# Patient Record
Sex: MALE | Race: WHITE | NOT HISPANIC OR LATINO | Employment: OTHER | URBAN - METROPOLITAN AREA
[De-identification: names, ages, dates, MRNs, and addresses within clinical notes are randomized per-mention and may not be internally consistent; named-entity substitution may affect disease eponyms.]

---

## 2019-09-24 ENCOUNTER — APPOINTMENT (OUTPATIENT)
Dept: GENERAL RADIOLOGY | Facility: HOSPITAL | Age: 84
End: 2019-09-24

## 2019-09-24 ENCOUNTER — HOSPITAL ENCOUNTER (EMERGENCY)
Facility: HOSPITAL | Age: 84
Discharge: HOME | End: 2019-09-24
Admitting: EMERGENCY MEDICINE

## 2019-09-24 VITALS
OXYGEN SATURATION: 99 % | TEMPERATURE: 97.5 F | BODY MASS INDEX: 19.72 KG/M2 | SYSTOLIC BLOOD PRESSURE: 144 MMHG | HEIGHT: 67 IN | DIASTOLIC BLOOD PRESSURE: 69 MMHG | HEART RATE: 67 BPM | WEIGHT: 125.66 LBS | RESPIRATION RATE: 16 BRPM

## 2019-09-24 DIAGNOSIS — S30.0XXA CONTUSION OF LOWER BACK, INITIAL ENCOUNTER: Primary | ICD-10-CM

## 2019-09-24 PROCEDURE — 99282 EMERGENCY DEPT VISIT SF MDM: CPT

## 2019-09-24 PROCEDURE — 72170 X-RAY EXAM OF PELVIS: CPT

## 2019-09-25 NOTE — ED PROVIDER NOTES
Subjective   Patient states that earlier this evening he slipped out of chair landing on his buttocks.  States initially he had pain going across his lower back but that has resolved.  He denies any bowel or bladder difficulties there is no saddle anesthesia.  Patient is ambulatory with assistance            Review of Systems   Constitutional: Negative for fever.   Gastrointestinal: Negative for abdominal pain.   Genitourinary: Negative for difficulty urinating.   Musculoskeletal: Positive for back pain. Negative for gait problem.       History reviewed. No pertinent past medical history.    Allergies   Allergen Reactions   • Sulfa Antibiotics Hives       History reviewed. No pertinent surgical history.    History reviewed. No pertinent family history.    Social History     Socioeconomic History   • Marital status:      Spouse name: Not on file   • Number of children: Not on file   • Years of education: Not on file   • Highest education level: Not on file   Tobacco Use   • Smoking status: Never Smoker   • Smokeless tobacco: Never Used   Substance and Sexual Activity   • Alcohol use: Yes     Alcohol/week: 4.2 oz     Types: 7 Glasses of wine per week   • Drug use: No           Objective   Physical Exam  89-year-old gentleman awake alert and in no acute distress there is no obvious injuries to face or head the neck is supple nontender with good range of motion of the back visually there is no obvious abnormality no bruising swelling deformity there is no point tenderness along the spine he has minimal tenderness across the lower back with no palpable abnormality and pelvis are stable on palpation tendon reflexes are equal in the legs  Procedures           ED Course    Xr Pelvis 1 Or 2 View    Result Date: 9/24/2019  No acute fracture is identified.  Electronically Signed By-Dr. Tyler Winston MD On:9/24/2019 9:12 PM This report was finalized on [id number] by Dr. Tyler Winston MD.      Patient has negative  x-ray will use supportive care for his low back contusion            MDM    Final diagnoses:   Contusion of lower back, initial encounter              Rashawn Dunaway, SOFIA  09/24/19 7168

## 2019-09-25 NOTE — ED NOTES
Pt states was sitting on edge of couch and going to stand.  When he reached for the bed post when he went to stand he didn't grab it and had to sit himself down on the floor.  Granddaughter states he was there about an hour.  C/o lower back pain at first but no c/o at present per [facility].      Deepali Sebastian RN  09/24/19 2110

## 2020-03-05 ENCOUNTER — OFFICE VISIT (OUTPATIENT)
Dept: WOUND CARE | Facility: HOSPITAL | Age: 85
End: 2020-03-05

## 2020-03-05 PROCEDURE — G0463 HOSPITAL OUTPT CLINIC VISIT: HCPCS

## 2020-03-06 ENCOUNTER — TRANSCRIBE ORDERS (OUTPATIENT)
Dept: LAB | Facility: HOSPITAL | Age: 85
End: 2020-03-06

## 2020-03-06 ENCOUNTER — LAB (OUTPATIENT)
Dept: LAB | Facility: HOSPITAL | Age: 85
End: 2020-03-06

## 2020-03-06 DIAGNOSIS — L89.152 STAGE II PRESSURE ULCER OF SACRAL REGION (HCC): ICD-10-CM

## 2020-03-06 DIAGNOSIS — L89.152 STAGE II PRESSURE ULCER OF SACRAL REGION (HCC): Primary | ICD-10-CM

## 2020-03-06 LAB
ALBUMIN SERPL-MCNC: 4.5 G/DL (ref 3.5–5.2)
ALBUMIN/GLOB SERPL: 1.5 G/DL
ALP SERPL-CCNC: 64 U/L (ref 39–117)
ALT SERPL W P-5'-P-CCNC: 15 U/L (ref 1–41)
ANION GAP SERPL CALCULATED.3IONS-SCNC: 10.1 MMOL/L (ref 5–15)
AST SERPL-CCNC: 29 U/L (ref 1–40)
BASOPHILS # BLD AUTO: 0.03 10*3/MM3 (ref 0–0.2)
BASOPHILS NFR BLD AUTO: 0.5 % (ref 0–1.5)
BILIRUB SERPL-MCNC: 0.4 MG/DL (ref 0.2–1.2)
BUN BLD-MCNC: 16 MG/DL (ref 8–23)
BUN/CREAT SERPL: 15.1 (ref 7–25)
CALCIUM SPEC-SCNC: 9.8 MG/DL (ref 8.2–9.6)
CHLORIDE SERPL-SCNC: 99 MMOL/L (ref 98–107)
CO2 SERPL-SCNC: 29.9 MMOL/L (ref 22–29)
CREAT BLD-MCNC: 1.06 MG/DL (ref 0.76–1.27)
DEPRECATED RDW RBC AUTO: 41.2 FL (ref 37–54)
EOSINOPHIL # BLD AUTO: 0.08 10*3/MM3 (ref 0–0.4)
EOSINOPHIL NFR BLD AUTO: 1.4 % (ref 0.3–6.2)
ERYTHROCYTE [DISTWIDTH] IN BLOOD BY AUTOMATED COUNT: 12.1 % (ref 12.3–15.4)
GFR SERPL CREATININE-BSD FRML MDRD: 65 ML/MIN/1.73
GLOBULIN UR ELPH-MCNC: 3 GM/DL
GLUCOSE BLD-MCNC: 98 MG/DL (ref 65–99)
HCT VFR BLD AUTO: 43.7 % (ref 37.5–51)
HGB BLD-MCNC: 15.2 G/DL (ref 13–17.7)
IMM GRANULOCYTES # BLD AUTO: 0.02 10*3/MM3 (ref 0–0.05)
IMM GRANULOCYTES NFR BLD AUTO: 0.3 % (ref 0–0.5)
LYMPHOCYTES # BLD AUTO: 1.67 10*3/MM3 (ref 0.7–3.1)
LYMPHOCYTES NFR BLD AUTO: 29 % (ref 19.6–45.3)
MCH RBC QN AUTO: 32.1 PG (ref 26.6–33)
MCHC RBC AUTO-ENTMCNC: 34.8 G/DL (ref 31.5–35.7)
MCV RBC AUTO: 92.2 FL (ref 79–97)
MONOCYTES # BLD AUTO: 0.71 10*3/MM3 (ref 0.1–0.9)
MONOCYTES NFR BLD AUTO: 12.3 % (ref 5–12)
NEUTROPHILS # BLD AUTO: 3.24 10*3/MM3 (ref 1.7–7)
NEUTROPHILS NFR BLD AUTO: 56.5 % (ref 42.7–76)
NRBC BLD AUTO-RTO: 0 /100 WBC (ref 0–0.2)
PLATELET # BLD AUTO: 224 10*3/MM3 (ref 140–450)
PMV BLD AUTO: 11 FL (ref 6–12)
POTASSIUM BLD-SCNC: 4.1 MMOL/L (ref 3.5–5.2)
PREALB SERPL-MCNC: 25.9 MG/DL (ref 20–40)
PROT SERPL-MCNC: 7.5 G/DL (ref 6–8.5)
RBC # BLD AUTO: 4.74 10*6/MM3 (ref 4.14–5.8)
SODIUM BLD-SCNC: 139 MMOL/L (ref 136–145)
WBC NRBC COR # BLD: 5.75 10*3/MM3 (ref 3.4–10.8)

## 2020-03-06 PROCEDURE — 36415 COLL VENOUS BLD VENIPUNCTURE: CPT

## 2020-03-06 PROCEDURE — 80053 COMPREHEN METABOLIC PANEL: CPT

## 2020-03-06 PROCEDURE — 84134 ASSAY OF PREALBUMIN: CPT

## 2020-03-06 PROCEDURE — 85025 COMPLETE CBC W/AUTO DIFF WBC: CPT

## 2020-03-12 ENCOUNTER — APPOINTMENT (OUTPATIENT)
Dept: WOUND CARE | Facility: HOSPITAL | Age: 85
End: 2020-03-12

## 2020-03-19 ENCOUNTER — APPOINTMENT (OUTPATIENT)
Dept: WOUND CARE | Facility: HOSPITAL | Age: 85
End: 2020-03-19

## 2021-01-31 ENCOUNTER — HOSPITAL ENCOUNTER (EMERGENCY)
Facility: HOSPITAL | Age: 86
Discharge: HOME | End: 2021-01-31
Attending: EMERGENCY MEDICINE | Admitting: EMERGENCY MEDICINE

## 2021-01-31 ENCOUNTER — APPOINTMENT (OUTPATIENT)
Dept: GENERAL RADIOLOGY | Facility: HOSPITAL | Age: 86
End: 2021-01-31

## 2021-01-31 ENCOUNTER — APPOINTMENT (OUTPATIENT)
Dept: CT IMAGING | Facility: HOSPITAL | Age: 86
End: 2021-01-31

## 2021-01-31 VITALS
WEIGHT: 125 LBS | HEIGHT: 70 IN | TEMPERATURE: 98.1 F | SYSTOLIC BLOOD PRESSURE: 159 MMHG | OXYGEN SATURATION: 96 % | DIASTOLIC BLOOD PRESSURE: 69 MMHG | RESPIRATION RATE: 16 BRPM | HEART RATE: 67 BPM | BODY MASS INDEX: 17.9 KG/M2
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
## 2021-01-31 DIAGNOSIS — S20.229A CONTUSION OF BACK, UNSPECIFIED LATERALITY, INITIAL ENCOUNTER: ICD-10-CM

## 2021-01-31 DIAGNOSIS — S00.03XA CONTUSION OF SCALP, INITIAL ENCOUNTER: ICD-10-CM

## 2021-01-31 LAB
ALBUMIN SERPL-MCNC: 4.3 G/DL (ref 3.5–5.2)
ALBUMIN/GLOB SERPL: 1.2 G/DL
ALP SERPL-CCNC: 87 U/L (ref 39–117)
ALT SERPL W P-5'-P-CCNC: 17 U/L (ref 1–41)
ANION GAP SERPL CALCULATED.3IONS-SCNC: 12 MMOL/L (ref 5–15)
AST SERPL-CCNC: 38 U/L (ref 1–40)
BACTERIA UR QL AUTO: ABNORMAL /HPF
BASOPHILS # BLD AUTO: 0 10*3/MM3 (ref 0–0.2)
BASOPHILS NFR BLD AUTO: 0.2 % (ref 0–1.5)
BILIRUB SERPL-MCNC: 0.4 MG/DL (ref 0–1.2)
BILIRUB UR QL STRIP: NEGATIVE
BUN SERPL-MCNC: 22 MG/DL (ref 8–23)
BUN/CREAT SERPL: 18.3 (ref 7–25)
CALCIUM SPEC-SCNC: 9.9 MG/DL (ref 8.2–9.6)
CHLORIDE SERPL-SCNC: 102 MMOL/L (ref 98–107)
CK SERPL-CCNC: 824 U/L (ref 20–200)
CLARITY UR: CLEAR
CO2 SERPL-SCNC: 25 MMOL/L (ref 22–29)
COHGB MFR BLD: 1.8 % (ref 0–3)
COLOR UR: YELLOW
CREAT SERPL-MCNC: 1.2 MG/DL (ref 0.76–1.27)
DEPRECATED RDW RBC AUTO: 42.4 FL (ref 37–54)
EOSINOPHIL # BLD AUTO: 0 10*3/MM3 (ref 0–0.4)
EOSINOPHIL NFR BLD AUTO: 0 % (ref 0.3–6.2)
ERYTHROCYTE [DISTWIDTH] IN BLOOD BY AUTOMATED COUNT: 13.2 % (ref 12.3–15.4)
GFR SERPL CREATININE-BSD FRML MDRD: 56 ML/MIN/1.73
GLOBULIN UR ELPH-MCNC: 3.5 GM/DL
GLUCOSE SERPL-MCNC: 147 MG/DL (ref 65–99)
GLUCOSE UR STRIP-MCNC: NEGATIVE MG/DL
HCT VFR BLD AUTO: 42.1 % (ref 37.5–51)
HGB BLD-MCNC: 14.2 G/DL (ref 13–17.7)
HGB UR QL STRIP.AUTO: ABNORMAL
HYALINE CASTS UR QL AUTO: ABNORMAL /LPF
KETONES UR QL STRIP: NEGATIVE
LEUKOCYTE ESTERASE UR QL STRIP.AUTO: NEGATIVE
LYMPHOCYTES # BLD AUTO: 0.4 10*3/MM3 (ref 0.7–3.1)
LYMPHOCYTES NFR BLD AUTO: 3.4 % (ref 19.6–45.3)
MAGNESIUM SERPL-MCNC: 1.8 MG/DL (ref 1.7–2.3)
MCH RBC QN AUTO: 31.7 PG (ref 26.6–33)
MCHC RBC AUTO-ENTMCNC: 33.8 G/DL (ref 31.5–35.7)
MCV RBC AUTO: 93.7 FL (ref 79–97)
MONOCYTES # BLD AUTO: 1.1 10*3/MM3 (ref 0.1–0.9)
MONOCYTES NFR BLD AUTO: 8.7 % (ref 5–12)
NEUTROPHILS NFR BLD AUTO: 10.7 10*3/MM3 (ref 1.7–7)
NEUTROPHILS NFR BLD AUTO: 87.7 % (ref 42.7–76)
NITRITE UR QL STRIP: NEGATIVE
NRBC BLD AUTO-RTO: 0.1 /100 WBC (ref 0–0.2)
PH UR STRIP.AUTO: <=5 [PH] (ref 5–8)
PLATELET # BLD AUTO: 228 10*3/MM3 (ref 140–450)
PMV BLD AUTO: 8.3 FL (ref 6–12)
POTASSIUM SERPL-SCNC: 4.1 MMOL/L (ref 3.5–5.2)
PROT SERPL-MCNC: 7.8 G/DL (ref 6–8.5)
PROT UR QL STRIP: ABNORMAL
RBC # BLD AUTO: 4.49 10*6/MM3 (ref 4.14–5.8)
RBC # UR: ABNORMAL /HPF
REF LAB TEST METHOD: ABNORMAL
SODIUM SERPL-SCNC: 139 MMOL/L (ref 136–145)
SP GR UR STRIP: 1.02 (ref 1–1.03)
SQUAMOUS #/AREA URNS HPF: ABNORMAL /HPF
TROPONIN T SERPL-MCNC: <0.01 NG/ML (ref 0–0.03)
UROBILINOGEN UR QL STRIP: ABNORMAL
WBC # BLD AUTO: 12.2 10*3/MM3 (ref 3.4–10.8)
WBC UR QL AUTO: ABNORMAL /HPF
WHOLE BLOOD HOLD SPECIMEN: NORMAL

## 2021-01-31 PROCEDURE — P9612 CATHETERIZE FOR URINE SPEC: HCPCS

## 2021-01-31 PROCEDURE — 83735 ASSAY OF MAGNESIUM: CPT | Performed by: EMERGENCY MEDICINE

## 2021-01-31 PROCEDURE — 81001 URINALYSIS AUTO W/SCOPE: CPT | Performed by: EMERGENCY MEDICINE

## 2021-01-31 PROCEDURE — 70450 CT HEAD/BRAIN W/O DYE: CPT

## 2021-01-31 PROCEDURE — 84484 ASSAY OF TROPONIN QUANT: CPT | Performed by: EMERGENCY MEDICINE

## 2021-01-31 PROCEDURE — 72128 CT CHEST SPINE W/O DYE: CPT

## 2021-01-31 PROCEDURE — 80053 COMPREHEN METABOLIC PANEL: CPT | Performed by: EMERGENCY MEDICINE

## 2021-01-31 PROCEDURE — 99284 EMERGENCY DEPT VISIT MOD MDM: CPT

## 2021-01-31 PROCEDURE — 85025 COMPLETE CBC W/AUTO DIFF WBC: CPT | Performed by: EMERGENCY MEDICINE

## 2021-01-31 PROCEDURE — 72125 CT NECK SPINE W/O DYE: CPT

## 2021-01-31 PROCEDURE — 93005 ELECTROCARDIOGRAM TRACING: CPT | Performed by: EMERGENCY MEDICINE

## 2021-01-31 PROCEDURE — 71045 X-RAY EXAM CHEST 1 VIEW: CPT

## 2021-01-31 PROCEDURE — 82375 ASSAY CARBOXYHB QUANT: CPT | Performed by: EMERGENCY MEDICINE

## 2021-01-31 PROCEDURE — 82550 ASSAY OF CK (CPK): CPT | Performed by: EMERGENCY MEDICINE

## 2021-01-31 RX ORDER — SODIUM CHLORIDE 0.9 % (FLUSH) 0.9 %
10 SYRINGE (ML) INJECTION AS NEEDED
Status: DISCONTINUED | OUTPATIENT
Start: 2021-01-31 | End: 2021-01-31 | Stop reason: HOSPADM

## 2021-01-31 RX ORDER — ACETAMINOPHEN 500 MG
1000 TABLET ORAL ONCE
Status: DISCONTINUED | OUTPATIENT
Start: 2021-01-31 | End: 2021-01-31 | Stop reason: HOSPADM

## 2021-01-31 RX ADMIN — SODIUM CHLORIDE 500 ML: 9 INJECTION, SOLUTION INTRAVENOUS at 13:10

## 2021-02-02 LAB — QT INTERVAL: 435 MS

## 2021-11-11 ENCOUNTER — HOSPITAL ENCOUNTER (OUTPATIENT)
Facility: HOSPITAL | Age: 86
Setting detail: OBSERVATION
Discharge: HOME | End: 2021-11-13
Attending: EMERGENCY MEDICINE | Admitting: EMERGENCY MEDICINE

## 2021-11-11 DIAGNOSIS — S03.2XXA TOOTH AVULSION, INITIAL ENCOUNTER: ICD-10-CM
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
## 2021-11-11 DIAGNOSIS — R53.1 WEAKNESS: Primary | ICD-10-CM

## 2021-11-11 LAB
ANION GAP SERPL CALCULATED.3IONS-SCNC: 14 MMOL/L (ref 5–15)
BASOPHILS # BLD AUTO: 0 10*3/MM3 (ref 0–0.2)
BASOPHILS NFR BLD AUTO: 0.2 % (ref 0–1.5)
BUN SERPL-MCNC: 23 MG/DL (ref 8–23)
BUN/CREAT SERPL: 23 (ref 7–25)
CALCIUM SPEC-SCNC: 9.5 MG/DL (ref 8.2–9.6)
CHLORIDE SERPL-SCNC: 102 MMOL/L (ref 98–107)
CK SERPL-CCNC: 318 U/L (ref 20–200)
CO2 SERPL-SCNC: 23 MMOL/L (ref 22–29)
CREAT SERPL-MCNC: 1 MG/DL (ref 0.76–1.27)
DEPRECATED RDW RBC AUTO: 43.8 FL (ref 37–54)
EOSINOPHIL # BLD AUTO: 0 10*3/MM3 (ref 0–0.4)
EOSINOPHIL NFR BLD AUTO: 0 % (ref 0.3–6.2)
ERYTHROCYTE [DISTWIDTH] IN BLOOD BY AUTOMATED COUNT: 13.4 % (ref 12.3–15.4)
GFR SERPL CREATININE-BSD FRML MDRD: 69 ML/MIN/1.73
GLUCOSE SERPL-MCNC: 117 MG/DL (ref 65–99)
HCT VFR BLD AUTO: 38.4 % (ref 37.5–51)
HGB BLD-MCNC: 13.5 G/DL (ref 13–17.7)
LYMPHOCYTES # BLD AUTO: 0.5 10*3/MM3 (ref 0.7–3.1)
LYMPHOCYTES NFR BLD AUTO: 3.3 % (ref 19.6–45.3)
MCH RBC QN AUTO: 32.6 PG (ref 26.6–33)
MCHC RBC AUTO-ENTMCNC: 35.2 G/DL (ref 31.5–35.7)
MCV RBC AUTO: 92.7 FL (ref 79–97)
MONOCYTES # BLD AUTO: 1.1 10*3/MM3 (ref 0.1–0.9)
MONOCYTES NFR BLD AUTO: 7.6 % (ref 5–12)
NEUTROPHILS NFR BLD AUTO: 12.4 10*3/MM3 (ref 1.7–7)
NEUTROPHILS NFR BLD AUTO: 88.9 % (ref 42.7–76)
NRBC BLD AUTO-RTO: 0 /100 WBC (ref 0–0.2)
PLATELET # BLD AUTO: 196 10*3/MM3 (ref 140–450)
PMV BLD AUTO: 8.3 FL (ref 6–12)
POTASSIUM SERPL-SCNC: 3.9 MMOL/L (ref 3.5–5.2)
RBC # BLD AUTO: 4.14 10*6/MM3 (ref 4.14–5.8)
SARS-COV-2 RNA PNL SPEC NAA+PROBE: NOT DETECTED
SODIUM SERPL-SCNC: 139 MMOL/L (ref 136–145)
TROPONIN T SERPL-MCNC: 0.03 NG/ML (ref 0–0.03)
TROPONIN T SERPL-MCNC: 0.05 NG/ML (ref 0–0.03)
WBC # BLD AUTO: 14 10*3/MM3 (ref 3.4–10.8)

## 2021-11-11 PROCEDURE — 85025 COMPLETE CBC W/AUTO DIFF WBC: CPT | Performed by: EMERGENCY MEDICINE

## 2021-11-11 PROCEDURE — 87635 SARS-COV-2 COVID-19 AMP PRB: CPT | Performed by: EMERGENCY MEDICINE

## 2021-11-11 PROCEDURE — G0378 HOSPITAL OBSERVATION PER HR: HCPCS

## 2021-11-11 PROCEDURE — 93005 ELECTROCARDIOGRAM TRACING: CPT | Performed by: EMERGENCY MEDICINE

## 2021-11-11 PROCEDURE — 84484 ASSAY OF TROPONIN QUANT: CPT | Performed by: EMERGENCY MEDICINE

## 2021-11-11 PROCEDURE — 80048 BASIC METABOLIC PNL TOTAL CA: CPT | Performed by: EMERGENCY MEDICINE

## 2021-11-11 PROCEDURE — 82550 ASSAY OF CK (CPK): CPT | Performed by: EMERGENCY MEDICINE

## 2021-11-11 PROCEDURE — C9803 HOPD COVID-19 SPEC COLLECT: HCPCS

## 2021-11-11 PROCEDURE — 99284 EMERGENCY DEPT VISIT MOD MDM: CPT

## 2021-11-11 PROCEDURE — 36415 COLL VENOUS BLD VENIPUNCTURE: CPT | Performed by: EMERGENCY MEDICINE

## 2021-11-11 RX ORDER — ONDANSETRON 2 MG/ML
4 INJECTION INTRAMUSCULAR; INTRAVENOUS EVERY 6 HOURS PRN
Status: DISCONTINUED | OUTPATIENT
Start: 2021-11-11 | End: 2021-11-13 | Stop reason: HOSPADM

## 2021-11-11 RX ORDER — ASPIRIN 81 MG/1
324 TABLET ORAL ONCE
Status: DISCONTINUED | OUTPATIENT
Start: 2021-11-11 | End: 2021-11-11

## 2021-11-11 RX ORDER — ACETAMINOPHEN 325 MG/1
650 TABLET ORAL EVERY 4 HOURS PRN
Status: DISCONTINUED | OUTPATIENT
Start: 2021-11-11 | End: 2021-11-13 | Stop reason: HOSPADM

## 2021-11-11 RX ORDER — SODIUM CHLORIDE 0.9 % (FLUSH) 0.9 %
10 SYRINGE (ML) INJECTION AS NEEDED
Status: DISCONTINUED | OUTPATIENT
Start: 2021-11-11 | End: 2021-11-13 | Stop reason: HOSPADM

## 2021-11-11 RX ORDER — ASPIRIN 325 MG
325 TABLET ORAL ONCE
Status: DISCONTINUED | OUTPATIENT
Start: 2021-11-12 | End: 2021-11-11

## 2021-11-11 RX ORDER — IBUPROFEN 200 MG
200 TABLET ORAL EVERY 6 HOURS PRN
COMMUNITY

## 2021-11-11 RX ORDER — DOCUSATE SODIUM 100 MG/1
100 CAPSULE, LIQUID FILLED ORAL DAILY
COMMUNITY

## 2021-11-11 RX ORDER — ASPIRIN 325 MG
325 TABLET, DELAYED RELEASE (ENTERIC COATED) ORAL ONCE
Status: COMPLETED | OUTPATIENT
Start: 2021-11-12 | End: 2021-11-11

## 2021-11-11 RX ORDER — LATANOPROST 50 UG/ML
1 SOLUTION/ DROPS OPHTHALMIC NIGHTLY
COMMUNITY

## 2021-11-11 RX ORDER — CHOLECALCIFEROL (VITAMIN D3) 125 MCG
5 CAPSULE ORAL NIGHTLY PRN
Status: DISCONTINUED | OUTPATIENT
Start: 2021-11-11 | End: 2021-11-13 | Stop reason: HOSPADM

## 2021-11-11 RX ORDER — SODIUM CHLORIDE 0.9 % (FLUSH) 0.9 %
10 SYRINGE (ML) INJECTION EVERY 12 HOURS SCHEDULED
Status: DISCONTINUED | OUTPATIENT
Start: 2021-11-11 | End: 2021-11-13 | Stop reason: HOSPADM

## 2021-11-11 RX ORDER — DORZOLAMIDE HCL 20 MG/ML
1 SOLUTION/ DROPS OPHTHALMIC 2 TIMES DAILY
COMMUNITY

## 2021-11-11 RX ADMIN — SODIUM CHLORIDE, PRESERVATIVE FREE 10 ML: 5 INJECTION INTRAVENOUS at 20:52

## 2021-11-11 RX ADMIN — ASPIRIN 325 MG: 325 TABLET, DELAYED RELEASE ORAL at 23:27

## 2021-11-11 NOTE — ED PROVIDER NOTES
Subjective   History of Present Illness  Context: 89-year-old male presents after fell at home today.  He was actually caught on camera.  He was using his walker when he just collapsed and fell forward.  He does not think he had syncope.  He really does not complain of any pain at this time.  He was on the ground for about 3 hours before he was found.  He reports no shortness of breath abdominal pain vomiting diarrhea.  He is hard of hearing.  Location: Generalized  Quality: Weakness  Duration: This afternoon  Timing: Constant  Severity:   Modifying factors: Was using walker at time he fell  Associated signs and symptoms:    Review of Systems   Constitutional: Negative for fever.   HENT: Positive for dental problem. Negative for congestion and sore throat.             Eyes: Negative for pain.        Macular degeneration   Respiratory: Negative for cough and shortness of breath.    Cardiovascular: Negative for chest pain and leg swelling.   Gastrointestinal: Positive for constipation. Negative for abdominal pain, diarrhea and vomiting.   Genitourinary: Negative for dysuria and urgency.   Musculoskeletal: Negative for back pain.   Skin: Negative for rash.   Neurological: Positive for weakness. Negative for dizziness and headaches.   Psychiatric/Behavioral: Negative for confusion.       No past medical history on file.  Macular degeneration  Allergies   Allergen Reactions   • Sulfa Antibiotics Hives       No past surgical history on file.    No family history on file.  Social history lives at home with family  Social History     Socioeconomic History   • Marital status:    Tobacco Use   • Smoking status: Never Smoker   • Smokeless tobacco: Never Used   Substance and Sexual Activity   • Alcohol use: Yes     Alcohol/week: 7.0 standard drinks     Types: 7 Glasses of wine per week   • Drug use: No     Only medications are eyedrops and occasional stool softener      Objective   Physical Exam  89-year-old male awake  alert.  He is hard of hearing.  Pupils equal round at light.  He has avulsion of #9 tooth.  There is no bleeding.  Family reports he was scheduled to have teeth extracted.  He appears to have a contusion anterior chin.  He does not complain of any mandibular tenderness he has no difficulty with opening and closing.  He has no complaints of cervical thoracic or lumbar pain.  Chest clear nontender cardiovascular regular rhythm.  Abdomen soft nontender.  Examination extremities he has intact movement of arms legs without deficit.  Speech clear.  Neurologic exam without focal findings noted.  Procedures           ED Course      Results for orders placed or performed during the hospital encounter of 11/11/21   Basic Metabolic Panel    Specimen: Blood   Result Value Ref Range    Glucose 117 (H) 65 - 99 mg/dL    BUN 23 8 - 23 mg/dL    Creatinine 1.00 0.76 - 1.27 mg/dL    Sodium 139 136 - 145 mmol/L    Potassium 3.9 3.5 - 5.2 mmol/L    Chloride 102 98 - 107 mmol/L    CO2 23.0 22.0 - 29.0 mmol/L    Calcium 9.5 8.2 - 9.6 mg/dL    eGFR Non African Amer 69 >60 mL/min/1.73    BUN/Creatinine Ratio 23.0 7.0 - 25.0    Anion Gap 14.0 5.0 - 15.0 mmol/L   Troponin    Specimen: Blood   Result Value Ref Range    Troponin T 0.032 (C) 0.000 - 0.030 ng/mL   CK    Specimen: Blood   Result Value Ref Range    Creatine Kinase 318 (H) 20 - 200 U/L   CBC Auto Differential    Specimen: Blood   Result Value Ref Range    WBC 14.00 (H) 3.40 - 10.80 10*3/mm3    RBC 4.14 4.14 - 5.80 10*6/mm3    Hemoglobin 13.5 13.0 - 17.7 g/dL    Hematocrit 38.4 37.5 - 51.0 %    MCV 92.7 79.0 - 97.0 fL    MCH 32.6 26.6 - 33.0 pg    MCHC 35.2 31.5 - 35.7 g/dL    RDW 13.4 12.3 - 15.4 %    RDW-SD 43.8 37.0 - 54.0 fl    MPV 8.3 6.0 - 12.0 fL    Platelets 196 140 - 450 10*3/mm3    Neutrophil % 88.9 (H) 42.7 - 76.0 %    Lymphocyte % 3.3 (L) 19.6 - 45.3 %    Monocyte % 7.6 5.0 - 12.0 %    Eosinophil % 0.0 (L) 0.3 - 6.2 %    Basophil % 0.2 0.0 - 1.5 %    Neutrophils, Absolute  "12.40 (H) 1.70 - 7.00 10*3/mm3    Lymphocytes, Absolute 0.50 (L) 0.70 - 3.10 10*3/mm3    Monocytes, Absolute 1.10 (H) 0.10 - 0.90 10*3/mm3    Eosinophils, Absolute 0.00 0.00 - 0.40 10*3/mm3    Basophils, Absolute 0.00 0.00 - 0.20 10*3/mm3    nRBC 0.0 0.0 - 0.2 /100 WBC   ECG 12 Lead   Result Value Ref Range    QT Interval 415 ms     No radiology results for the last day  Medications   sodium chloride 0.9 % flush 10 mL (has no administration in time range)     /68 (BP Location: Right arm, Patient Position: Lying)   Pulse 88   Temp 98.1 °F (36.7 °C) (Oral)   Resp 16   Ht 170.2 cm (67\")   Wt 65.8 kg (145 lb)   SpO2 100%   BMI 22.71 kg/m²                                        MDM  Chart review: Patient had evaluation for fall January of this year.  Otherwise he is followed by ophthalmology for macular degeneration and open-angle coma.  Comorbidity: As per past history  Differential: Syncope, rhabdomyolysis, arrhythmia, weakness  My EKG interpretation: Sinus rhythm with anterior ST elevation nonspecific.  There is no reciprocal change.  Compared to previous EKG similar findings were noted.  Lab: Essentially normal basic metabolic panel glucose 117 CK mildly elevated at 318 troponin borderline elevated 0.032 CBC white count 14 with hemoglobin 13.5 platelet count 196 88 segs no bands  Radiology:   Discussion/treatment: Patient had IV placed.  He will be brought in for observation.  May need PT to evaluate for ambulation.  He will be placed on telemetry repeat troponin and CK  Patient was evaluated using appropriate PPE      Final diagnoses:   Weakness   Fall, initial encounter   Tooth avulsion, initial encounter       ED Disposition  ED Disposition     ED Disposition Condition Comment    Decision to Admit            No follow-up provider specified.       Medication List      No changes were made to your prescriptions during this visit.          Brandin Rajput MD  11/11/21 9869    "

## 2021-11-11 NOTE — ED NOTES
Pt fell earlier around 1230 at home while walking. Pt lives with granddaughter. Granddaughter states she was gone, but has a camera in his room. Video appears pt got weak and fell forward, then army crawled over to doorway, where she found him. Pt reported lower back pain to granddaughter after fall. Pt is not complaining of any pain. Pt lost his front tooth during the fall. Pt is very hard of hearing.      Aline Larry RN  11/11/21 8490

## 2021-11-12 ENCOUNTER — APPOINTMENT (OUTPATIENT)
Dept: CARDIOLOGY | Facility: HOSPITAL | Age: 86
End: 2021-11-12

## 2021-11-12 LAB
ANION GAP SERPL CALCULATED.3IONS-SCNC: 13 MMOL/L (ref 5–15)
BASOPHILS # BLD AUTO: 0 10*3/MM3 (ref 0–0.2)
BASOPHILS NFR BLD AUTO: 0.3 % (ref 0–1.5)
BH CV ECHO MEAS - ACS: 1.9 CM
BH CV ECHO MEAS - AI DEC SLOPE: 114.3 CM/SEC^2
BH CV ECHO MEAS - AI DEC TIME: 2.1 SEC
BH CV ECHO MEAS - AI MAX PG: 22.2 MMHG
BH CV ECHO MEAS - AI MAX VEL: 235.6 CM/SEC
BH CV ECHO MEAS - AI P1/2T: 603.8 MSEC
BH CV ECHO MEAS - AO MAX PG (FULL): 0.24 MMHG
BH CV ECHO MEAS - AO MAX PG: 3.4 MMHG
BH CV ECHO MEAS - AO MEAN PG (FULL): -0.02 MMHG
BH CV ECHO MEAS - AO MEAN PG: 1.6 MMHG
BH CV ECHO MEAS - AO ROOT AREA (BSA CORRECTED): 1.9
BH CV ECHO MEAS - AO ROOT AREA: 7.6 CM^2
BH CV ECHO MEAS - AO ROOT DIAM: 3.1 CM
BH CV ECHO MEAS - AO V2 MAX: 92.7 CM/SEC
BH CV ECHO MEAS - AO V2 MEAN: 57.5 CM/SEC
BH CV ECHO MEAS - AO V2 VTI: 21.7 CM
BH CV ECHO MEAS - ASC AORTA: 3.5 CM
BH CV ECHO MEAS - AVA(I,A): 3.1 CM^2
BH CV ECHO MEAS - AVA(I,D): 3.1 CM^2
BH CV ECHO MEAS - AVA(V,A): 3 CM^2
BH CV ECHO MEAS - AVA(V,D): 3 CM^2
BH CV ECHO MEAS - BSA(HAYCOCK): 1.7 M^2
BH CV ECHO MEAS - BSA: 1.7 M^2
BH CV ECHO MEAS - BZI_BMI: 21.3 KILOGRAMS/M^2
BH CV ECHO MEAS - BZI_METRIC_HEIGHT: 167.6 CM
BH CV ECHO MEAS - BZI_METRIC_WEIGHT: 59.9 KG
BH CV ECHO MEAS - EDV(CUBED): 68.8 ML
BH CV ECHO MEAS - EDV(MOD-SP4): 45.4 ML
BH CV ECHO MEAS - EDV(TEICH): 74.1 ML
BH CV ECHO MEAS - EF(CUBED): 74 %
BH CV ECHO MEAS - EF(MOD-BP): 66 %
BH CV ECHO MEAS - EF(MOD-SP4): 66.3 %
BH CV ECHO MEAS - EF(TEICH): 66.3 %
BH CV ECHO MEAS - ESV(CUBED): 17.9 ML
BH CV ECHO MEAS - ESV(MOD-SP4): 15.3 ML
BH CV ECHO MEAS - ESV(TEICH): 25 ML
BH CV ECHO MEAS - FS: 36.2 %
BH CV ECHO MEAS - IVS/LVPW: 1.3
BH CV ECHO MEAS - IVSD: 1.4 CM
BH CV ECHO MEAS - LA DIMENSION(2D): 3.2 CM
BH CV ECHO MEAS - LA DIMENSION: 3.1 CM
BH CV ECHO MEAS - LA/AO: 0.99
BH CV ECHO MEAS - LV DIASTOLIC VOL/BSA (35-75): 27.1 ML/M^2
BH CV ECHO MEAS - LV MASS(C)D: 180.2 GRAMS
BH CV ECHO MEAS - LV MASS(C)DI: 107.5 GRAMS/M^2
BH CV ECHO MEAS - LV MAX PG: 3.2 MMHG
BH CV ECHO MEAS - LV MEAN PG: 1.6 MMHG
BH CV ECHO MEAS - LV SYSTOLIC VOL/BSA (12-30): 9.1 ML/M^2
BH CV ECHO MEAS - LV V1 MAX: 89.4 CM/SEC
BH CV ECHO MEAS - LV V1 MEAN: 57.9 CM/SEC
BH CV ECHO MEAS - LV V1 VTI: 22.2 CM
BH CV ECHO MEAS - LVIDD: 4.1 CM
BH CV ECHO MEAS - LVIDS: 2.6 CM
BH CV ECHO MEAS - LVOT AREA: 3.1 CM^2
BH CV ECHO MEAS - LVOT DIAM: 2 CM
BH CV ECHO MEAS - LVPWD: 1.1 CM
BH CV ECHO MEAS - MV A MAX VEL: 101.5 CM/SEC
BH CV ECHO MEAS - MV DEC SLOPE: 313.7 CM/SEC^2
BH CV ECHO MEAS - MV DEC TIME: 0.26 SEC
BH CV ECHO MEAS - MV E MAX VEL: 82 CM/SEC
BH CV ECHO MEAS - MV E/A: 0.81
BH CV ECHO MEAS - MV MAX PG: 3.8 MMHG
BH CV ECHO MEAS - MV MEAN PG: 1.5 MMHG
BH CV ECHO MEAS - MV V2 MAX: 97.7 CM/SEC
BH CV ECHO MEAS - MV V2 MEAN: 57.9 CM/SEC
BH CV ECHO MEAS - MV V2 VTI: 29.7 CM
BH CV ECHO MEAS - MVA(VTI): 2.3 CM^2
BH CV ECHO MEAS - PA ACC TIME: 0.13 SEC
BH CV ECHO MEAS - PA MAX PG (FULL): 1.2 MMHG
BH CV ECHO MEAS - PA MAX PG: 2.6 MMHG
BH CV ECHO MEAS - PA MEAN PG (FULL): 0.88 MMHG
BH CV ECHO MEAS - PA MEAN PG: 1.5 MMHG
BH CV ECHO MEAS - PA PR(ACCEL): 19.8 MMHG
BH CV ECHO MEAS - PA V2 MAX: 81.4 CM/SEC
BH CV ECHO MEAS - PA V2 MEAN: 57.3 CM/SEC
BH CV ECHO MEAS - PA V2 VTI: 18.8 CM
BH CV ECHO MEAS - RAP SYSTOLE: 3 MMHG
BH CV ECHO MEAS - RV MAX PG: 1.5 MMHG
BH CV ECHO MEAS - RV MEAN PG: 0.65 MMHG
BH CV ECHO MEAS - RV V1 MAX: 60.3 CM/SEC
BH CV ECHO MEAS - RV V1 MEAN: 36.4 CM/SEC
BH CV ECHO MEAS - RV V1 VTI: 13.2 CM
BH CV ECHO MEAS - RVDD: 2.3 CM
BH CV ECHO MEAS - RVSP: 26.3 MMHG
BH CV ECHO MEAS - SI(AO): 98.7 ML/M^2
BH CV ECHO MEAS - SI(CUBED): 30.4 ML/M^2
BH CV ECHO MEAS - SI(LVOT): 40.5 ML/M^2
BH CV ECHO MEAS - SI(MOD-SP4): 17.9 ML/M^2
BH CV ECHO MEAS - SI(TEICH): 29.3 ML/M^2
BH CV ECHO MEAS - SV(AO): 165.4 ML
BH CV ECHO MEAS - SV(CUBED): 51 ML
BH CV ECHO MEAS - SV(LVOT): 68 ML
BH CV ECHO MEAS - SV(MOD-SP4): 30.1 ML
BH CV ECHO MEAS - SV(TEICH): 49.2 ML
BH CV ECHO MEAS - TR MAX VEL: 241.1 CM/SEC
BUN SERPL-MCNC: 19 MG/DL (ref 8–23)
BUN/CREAT SERPL: 18.6 (ref 7–25)
CALCIUM SPEC-SCNC: 8.9 MG/DL (ref 8.2–9.6)
CHLORIDE SERPL-SCNC: 102 MMOL/L (ref 98–107)
CK SERPL-CCNC: 1150 U/L (ref 20–200)
CK SERPL-CCNC: 904 U/L (ref 20–200)
CO2 SERPL-SCNC: 23 MMOL/L (ref 22–29)
CREAT SERPL-MCNC: 1.02 MG/DL (ref 0.76–1.27)
DEPRECATED RDW RBC AUTO: 43.3 FL (ref 37–54)
EOSINOPHIL # BLD AUTO: 0.1 10*3/MM3 (ref 0–0.4)
EOSINOPHIL NFR BLD AUTO: 0.7 % (ref 0.3–6.2)
ERYTHROCYTE [DISTWIDTH] IN BLOOD BY AUTOMATED COUNT: 13.4 % (ref 12.3–15.4)
GFR SERPL CREATININE-BSD FRML MDRD: 67 ML/MIN/1.73
GLUCOSE SERPL-MCNC: 80 MG/DL (ref 65–99)
HCT VFR BLD AUTO: 36.4 % (ref 37.5–51)
HGB BLD-MCNC: 12.7 G/DL (ref 13–17.7)
LYMPHOCYTES # BLD AUTO: 1.4 10*3/MM3 (ref 0.7–3.1)
LYMPHOCYTES NFR BLD AUTO: 17.7 % (ref 19.6–45.3)
MCH RBC QN AUTO: 32.2 PG (ref 26.6–33)
MCHC RBC AUTO-ENTMCNC: 34.8 G/DL (ref 31.5–35.7)
MCV RBC AUTO: 92.5 FL (ref 79–97)
MONOCYTES # BLD AUTO: 1.1 10*3/MM3 (ref 0.1–0.9)
MONOCYTES NFR BLD AUTO: 14.8 % (ref 5–12)
NEUTROPHILS NFR BLD AUTO: 5.1 10*3/MM3 (ref 1.7–7)
NEUTROPHILS NFR BLD AUTO: 66.5 % (ref 42.7–76)
NRBC BLD AUTO-RTO: 0.1 /100 WBC (ref 0–0.2)
PLATELET # BLD AUTO: 192 10*3/MM3 (ref 140–450)
PMV BLD AUTO: 8.8 FL (ref 6–12)
POTASSIUM SERPL-SCNC: 3.7 MMOL/L (ref 3.5–5.2)
RBC # BLD AUTO: 3.93 10*6/MM3 (ref 4.14–5.8)
SODIUM SERPL-SCNC: 138 MMOL/L (ref 136–145)
TROPONIN T SERPL-MCNC: 0.04 NG/ML (ref 0–0.03)
TROPONIN T SERPL-MCNC: 0.06 NG/ML (ref 0–0.03)
WBC # BLD AUTO: 7.7 10*3/MM3 (ref 3.4–10.8)

## 2021-11-12 PROCEDURE — G0378 HOSPITAL OBSERVATION PER HR: HCPCS

## 2021-11-12 PROCEDURE — 99204 OFFICE O/P NEW MOD 45 MIN: CPT | Performed by: INTERNAL MEDICINE

## 2021-11-12 PROCEDURE — 84484 ASSAY OF TROPONIN QUANT: CPT | Performed by: NURSE PRACTITIONER

## 2021-11-12 PROCEDURE — 82550 ASSAY OF CK (CPK): CPT | Performed by: NURSE PRACTITIONER

## 2021-11-12 PROCEDURE — 82550 ASSAY OF CK (CPK): CPT | Performed by: EMERGENCY MEDICINE

## 2021-11-12 PROCEDURE — 80048 BASIC METABOLIC PNL TOTAL CA: CPT | Performed by: EMERGENCY MEDICINE

## 2021-11-12 PROCEDURE — 84484 ASSAY OF TROPONIN QUANT: CPT | Performed by: EMERGENCY MEDICINE

## 2021-11-12 PROCEDURE — 96360 HYDRATION IV INFUSION INIT: CPT

## 2021-11-12 PROCEDURE — 93306 TTE W/DOPPLER COMPLETE: CPT

## 2021-11-12 PROCEDURE — 93306 TTE W/DOPPLER COMPLETE: CPT | Performed by: INTERNAL MEDICINE

## 2021-11-12 PROCEDURE — 85025 COMPLETE CBC W/AUTO DIFF WBC: CPT | Performed by: EMERGENCY MEDICINE

## 2021-11-12 RX ORDER — DORZOLAMIDE HCL 20 MG/ML
1 SOLUTION/ DROPS OPHTHALMIC 2 TIMES DAILY
Status: DISCONTINUED | OUTPATIENT
Start: 2021-11-12 | End: 2021-11-13 | Stop reason: HOSPADM

## 2021-11-12 RX ORDER — SODIUM CHLORIDE 9 MG/ML
100 INJECTION, SOLUTION INTRAVENOUS CONTINUOUS
Status: DISCONTINUED | OUTPATIENT
Start: 2021-11-12 | End: 2021-11-13 | Stop reason: HOSPADM

## 2021-11-12 RX ORDER — LATANOPROST 50 UG/ML
1 SOLUTION/ DROPS OPHTHALMIC NIGHTLY
Status: DISCONTINUED | OUTPATIENT
Start: 2021-11-12 | End: 2021-11-13 | Stop reason: HOSPADM

## 2021-11-12 RX ORDER — DOCUSATE SODIUM 100 MG/1
100 CAPSULE, LIQUID FILLED ORAL DAILY
Status: DISCONTINUED | OUTPATIENT
Start: 2021-11-12 | End: 2021-11-13 | Stop reason: HOSPADM

## 2021-11-12 RX ADMIN — DORZOLAMIDE HYDROCHLORIDE 1 DROP: 20 SOLUTION/ DROPS OPHTHALMIC at 22:33

## 2021-11-12 RX ADMIN — SODIUM CHLORIDE 100 ML/HR: 9 INJECTION, SOLUTION INTRAVENOUS at 09:28

## 2021-11-12 RX ADMIN — DORZOLAMIDE HYDROCHLORIDE 1 DROP: 20 SOLUTION/ DROPS OPHTHALMIC at 11:52

## 2021-11-12 RX ADMIN — SODIUM CHLORIDE 500 ML: 9 INJECTION, SOLUTION INTRAVENOUS at 09:27

## 2021-11-12 RX ADMIN — SODIUM CHLORIDE, PRESERVATIVE FREE 10 ML: 5 INJECTION INTRAVENOUS at 09:28

## 2021-11-12 RX ADMIN — LATANOPROST 1 DROP: 50 SOLUTION OPHTHALMIC at 22:31

## 2021-11-12 NOTE — H&P
[facility]    Patient Name: Julien Rincon  : 10/11/1922  MRN: [id number]  Primary Care Physician: Provider, No Known  Date of admission: 2021     Patient Care Team:  Provider, No Known as PCP - General          Subjective   History Present Illness     Chief Complaint:   Chief Complaint   Patient presents with   • Fall         Mr. Rincon is a 89 y.o.  presents to [facility] complaining of fall with inability to get up.      89-year-old male who presents to the ER after falling at home with inability to get up.  The patient cannot remember exactly what caused him to fall but had reported to his granddaughter with whom he lives that he just had a complete failure of his body without mention of dizziness or lightheadedness.  The fall was captured on video that the granddaughter has set up to help monitor the patient when he is left alone.  It appears the patient was fixing his breakfast when he turned to walk placing his hands on the walker, his knees then buckle and he falls to his right side.  The patient's face was away from the camera so there is no way to determine signs of loss of consciousness or dizziness.  The patient was conscious when the granddaughter got home and had tried multiple times on his own to get up but just could not.  The patient denies any overt areas of pain.    Social history: The patient lives with his granddaughter and is normally alone part of the day.  He uses a walker for ambulation in his room.      Review of Systems   Musculoskeletal: Positive for falls.   Neurological: Positive for weakness. Negative for focal weakness.   Psychiatric/Behavioral: Positive for memory loss.   All other systems reviewed and are negative.          Personal History     Past Medical History:   Past Medical History:   Diagnosis Date   • Hypertension        Surgical History:      Past Surgical History:   Procedure Laterality Date   • EAR MASTOIDECTOMY W/ COCHLEAR IMPLANT W/  LANDMARK 1995           Family History: family history is not on file. Otherwise pertinent FHx was reviewed and unremarkable.     Social History:  reports that he quit smoking about 6 years ago. His smoking use included cigars. He has never used smokeless tobacco. He reports current alcohol use of about 1.0 standard drink of alcohol per week. He reports that he does not use drugs.      Medications:  Prior to Admission medications    Medication Sig Start Date End Date Taking? Authorizing Provider   docusate sodium (COLACE) 100 MG capsule Take 100 mg by mouth Daily.   Yes Allyn Sharp MD   dorzolamide (TRUSOPT) 2 % ophthalmic solution Administer 1 drop to both eyes 2 (Two) Times a Day.   Yes Allyn Sharp MD   ibuprofen (ADVIL,MOTRIN) 200 MG tablet Take 200 mg by mouth Every 6 (Six) Hours As Needed for Mild Pain .   Yes Allyn Sharp MD   latanoprost (XALATAN) 0.005 % ophthalmic solution Administer 1 drop to both eyes Every Night.   Yes Allyn Sharp MD       Allergies:    Allergies   Allergen Reactions   • Penicillins Unknown - Low Severity     Reported by patient   • Sulfa Antibiotics Hives       Objective   Objective     Vital Signs  Temp:  [97.8 °F (36.6 °C)-98.1 °F (36.7 °C)] 97.8 °F (36.6 °C)  Heart Rate:  [57-88] 57  Resp:  [16-18] 16  BP: (117-148)/(61-87) 124/62  SpO2:  [95 %-100 %] 97 %  on   ;   Device (Oxygen Therapy): room air  Body mass index is 21.42 kg/m².    Physical Exam  Vitals and nursing note reviewed.   Constitutional:       General: He is not in acute distress.     Appearance: Normal appearance. He is not ill-appearing.      Comments: The patient is frail   HENT:      Head: Normocephalic and atraumatic.      Right Ear: External ear normal.      Left Ear: External ear normal.      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 No congestion or rhinorrhea.      Mouth/Throat:      Mouth: Mucous  membranes are dry.   Eyes:      General: No scleral icterus.        Right eye: No discharge.         Left eye: No discharge.      Extraocular Movements: Extraocular movements intact.      Conjunctiva/sclera: Conjunctivae normal.      Pupils: Pupils are equal, round, and reactive to light.   Cardiovascular:      Rate and Rhythm: Normal rate and regular rhythm.      Pulses: Normal pulses.      Heart sounds: Normal heart sounds. No murmur heard.      Pulmonary:      Effort: Pulmonary effort is normal.      Breath sounds: Normal breath sounds.   Abdominal:      General: Bowel sounds are normal. There is no distension.      Palpations: Abdomen is soft.   Musculoskeletal:         General: Normal range of motion.      Cervical back: Normal range of motion and neck supple.      Right lower leg: No edema.      Left lower leg: No edema.   Skin:     General: Skin is warm and dry.      Capillary Refill: Capillary refill takes less than 2 seconds.   Neurological:      General: No focal deficit present.      Mental Status: He is alert. Mental status is at baseline.      Comments: The patient is alert and oriented to person, place and relative time including season and year.   Psychiatric:         Mood and Affect: Mood normal.         Behavior: Behavior normal.         Thought Content: Thought content normal.         Judgment: Judgment normal.           Results Review:  I have personally reviewed most recent cardiac tracings, lab results and radiology images and interpretations and agree with findings.    Results from last 7 days   Lab Units 11/12/21  0641   WBC 10*3/mm3 7.70   HEMOGLOBIN g/dL 12.7*   HEMATOCRIT % 36.4*   PLATELETS 10*3/mm3 192     Results from last 7 days   Lab Units 11/12/21  0641 11/11/21  2043 11/11/21  1726 11/11/21  1726   SODIUM mmol/L 138  --    < > 139   POTASSIUM mmol/L 3.7  --    < > 3.9   CHLORIDE mmol/L 102  --    < > 102   CO2 mmol/L 23.0  --    < > 23.0   BUN mg/dL 19  --    < > 23   CREATININE  mg/dL 1.02  --    < > 1.00   GLUCOSE mg/dL 80  --    < > 117*   CALCIUM mg/dL 8.9  --    < > 9.5   TROPONIN T ng/mL 0.060* 0.051*  --  0.032*    < > = values in this interval not displayed.     Estimated Creatinine Clearance: 33.6 mL/min (by C-G formula based on SCr of 1.02 mg/dL).  Brief Urine Lab Results  (Last result in the past 365 days)      Color   Clarity   Blood   Leuk Est   Nitrite   Protein   CREAT   Urine HCG        01/31/21 1300 Yellow   Clear   Moderate (2+)   Negative   Negative   30 mg/dL (1+)                 Microbiology Results (last 10 days)     Procedure Component Value - Date/Time    COVID PRE-OP / PRE-PROCEDURE SCREENING ORDER (NO ISOLATION) - Swab, Nasopharynx [[id number]]  (Normal) Collected: 11/11/21 1918    Lab Status: Final result Specimen: Swab from Nasopharynx Updated: 11/11/21 1941    Narrative:      The following orders were created for panel order COVID PRE-OP / PRE-PROCEDURE SCREENING ORDER (NO ISOLATION) - Swab, Nasopharynx.  Procedure                               Abnormality         Status                     ---------                               -----------         ------                     COVID-19,CEPHEID/SHERRI/BD...[[id number]]  Normal              Final result                 Please view results for these tests on the individual orders.    COVID-19,CEPHEID/SHERRI/BDMAX,COR/ZAHEER/PAD/DINESH IN-HOUSE(OR EMERGENT/ADD-ON),NP SWAB IN TRANSPORT MEDIA 3-4 HR TAT, RT-PCR - Swab, Nasopharynx [[id number]]  (Normal) Collected: 11/11/21 1918    Lab Status: Final result Specimen: Swab from Nasopharynx Updated: 11/11/21 1941     COVID19 Not Detected    Narrative:      Fact sheet for providers: https://www.fda.gov/media/067094/download     Fact sheet for patients: https://www.fda.gov/media/357404/download  Fact sheet for providers: https://www.fda.gov/media/194395/download    Fact sheet for patients: https://www.fda.gov/media/755514/download    Test performed by PCR.          ECG/EMG Results (most  recent)     Procedure Component Value Units Date/Time    ECG 12 Lead [[id number]] Collected: 11/11/21 1720     Updated: 11/11/21 1722     QT Interval 415 ms     Narrative:      HEART RATE= 69  bpm  RR Interval= 872  ms  MO Interval= 179  ms  P Horizontal Axis= 37  deg  P Front Axis= 81  deg  QRSD Interval= 102  ms  QT Interval= 415  ms  QRS Axis= -50  deg  T Wave Axis= 62  deg  - ABNORMAL ECG -  Sinus rhythm  Left anterior fascicular block  ST elevation, consider anterior injury  Electronically Signed By:   Date and Time of Study: 2021-11-11 17:20:55                  No radiology results for the last 7 days      Estimated Creatinine Clearance: 33.6 mL/min (by C-G formula based on SCr of 1.02 mg/dL).    Assessment/Plan   Assessment/Plan       Active Hospital Problems    Diagnosis  POA   • Weakness [R53.1]  Yes      Resolved Hospital Problems   No resolved problems to display.     Generalized weakness with fall--likely multifactorial with advanced age, decreased oral intake, fall with subsequently elevated CK: Physical therapy consult    Rhabdomyolysis, moderate with increasing CK total from presentation 318 to 1,150 this a.m.--secondary to fall with immobility: 500 normal saline bolus; IV fluids at 100 cc/h; repeat CK total 6 hours after IV fluids initiated    Elevated troponin--consider relative to CK total, presentation 0.032 with increased to 0.060: Cardiology consult    Glaucoma, chronic: Continue latanoprost; continue dorzolamide      VTE Prophylaxis -   Mechanical Order History:      Ordered        11/11/21 1833  Place Sequential Compression Device  Once            11/11/21 1833  Maintain Sequential Compression Device  Continuous                    Pharmalogical Order History:     None          CODE STATUS:    Code Status and Medical Interventions:   Ordered at: 11/11/21 2055     Level Of Support Discussed With:    Health Care Surrogate     Code Status (Patient has no pulse and is not breathing):    No CPR (Do  Not Attempt to Resuscitate)     Medical Interventions (Patient has pulse or is breathing):    Full Support       This patient has been examined wearing personal protective equipment.     I discussed the patient's findings and my recommendations with patient, family and nursing staff.      Signature:Electronically signed by CR Garcia, 11/12/21, 9:46 AM EST.

## 2021-11-12 NOTE — NURSING NOTE
ASA 324mg not available in [facility]. Notified Pharmacy of need for medication.   Pharmacy tech on unit to troubleshoot. Received call at [phone] from pharmacy that order changed to 325mg EC per Dr. Rajput.

## 2021-11-12 NOTE — CASE MANAGEMENT/SOCIAL WORK
Continued Stay Note  [facility]     Patient Name: Julien Rincon  MRN: [id number]  Today's Date: 11/12/2021    Admit Date: 11/11/2021     Discharge Plan     Row Name 11/12/21 1406       Plan    Plan [facility] declined patient. Referral to [facility] pending acceptance.                          Donya Vines RN   Met with patient in room wearing PPE: mask, face shield/goggles, gloves, gown.      Maintained distance greater than six feet and spent less than 15 minutes in the room.

## 2021-11-12 NOTE — PLAN OF CARE
Goal Outcome Evaluation:  Plan of Care Reviewed With: patient, grandchild(fernando) Patient denies any pain at this time. Dr. Tovar has ordered a 2D Echo to rule out any heart concerns. Patient granddaughter is at bedside. No further concerns at this time. Will await results of 2D Echo.

## 2021-11-12 NOTE — PLAN OF CARE
Goal Outcome Evaluation:      Pt admitted from ED this shift with dx generalized weakness, fall at home. Pt lives with granddaughter, she had left the house for a few hours, she has a video camera setup in pt bedroom and was able to review footage to note patient standing up from bed and falling forward around 1230pm on 11/11/21. Pt crawled toward the door and remained in the floor until granddaughter arrived home at 3:30pm 11/11/21. Pt has a broken front tooth from the fall. Denies any pain or discomfort. Patient A&O to person/place, able to make needs known. [facility], has cochlear implant. Troponin in ER 0.032, repeat Troponin at 2130 0.051. Notified Dr. Rajput, order received for ASA 325mg one time dose. Labs scheduled for draw in AM 11/12/21. Noted blanchable redness to bilateral elbows and sacrum. Optifoam sacral dressing placed for preventative measures. IV #20g RFA patent, flushed without difficulty. Pt on continuous cardiac monitor, has been NSR with episodes of sinus bradycardia and PVC's. MD aware. Consult placed for PT/OT due to not at baseline ADL's, Case Management consult placed to discuss options for caregiver assist and safety concerns with pt being left alone at times. Granddaughter requested to speak with dietician due to patients poor appetite.

## 2021-11-12 NOTE — PLAN OF CARE
Pt admitted after fall at home.  Pt with elevated troponins.  Awaiting 2D Echo.  Will attempt 11/13

## 2021-11-12 NOTE — CONSULTS
Referring Provider: Hospitalist  Reason for Consultation: Status post fall    Patient Care Team:  Provider, No Known as PCP - General    Chief complaint this post fall    Subjective .     History of present illness:  Julien Rincon is a 89 y.o. male with history of hypertension the past presented to the hospital after a fall.  Patient was brought in by the granddaughter who he lives with.  She said that he was found down but had a been conscious.  Patient does not remember passing out.  He does not complain of any chest pain or shortness of breath.  No complains of any palpitations dizziness.  No swelling of the feet.    Review of Systems   Constitutional: Negative for fever and malaise/fatigue.   HENT: Negative for ear pain and nosebleeds.    Eyes: Negative for blurred vision and double vision.   Cardiovascular: Negative for chest pain, dyspnea on exertion and palpitations.   Respiratory: Negative for cough and shortness of breath.    Skin: Negative for rash.   Musculoskeletal: Negative for joint pain.   Gastrointestinal: Negative for abdominal pain, nausea and vomiting.   Neurological: Negative for focal weakness and headaches.   Psychiatric/Behavioral: Negative for depression. The patient is not nervous/anxious.    All other systems reviewed and are negative.      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 No pertinent family history.      Social History     Tobacco Use   • Smoking status: Former Smoker     Types: Cigars     Quit date:      Years since quittin.8   • Smokeless tobacco: Never Used   Vaping Use   • Vaping Use: Never used   Substance Use Topics   • Alcohol use: Yes     Alcohol/week: 1.0 standard drink     Types: 1 Glasses of wine per week   • Drug use: No        Medications Prior to Admission   Medication Sig Dispense Refill Last  "Dose   • docusate sodium (COLACE) 100 MG capsule Take 100 mg by mouth Daily.      • dorzolamide (TRUSOPT) 2 % ophthalmic solution Administer 1 drop to both eyes 2 (Two) Times a Day.   11/10/2021 at Unknown time   • ibuprofen (ADVIL,MOTRIN) 200 MG tablet Take 200 mg by mouth Every 6 (Six) Hours As Needed for Mild Pain .      • latanoprost (XALATAN) 0.005 % ophthalmic solution Administer 1 drop to both eyes Every Night.   11/10/2021 at Unknown time         Penicillins and Sulfa antibiotics    Scheduled Meds:docusate sodium, 100 mg, Oral, Daily  dorzolamide, 1 drop, Both Eyes, BID  latanoprost, 1 drop, Both Eyes, Nightly  sodium chloride, 10 mL, Intravenous, Q12H      Continuous Infusions:sodium chloride, 100 mL/hr, Last Rate: 100 mL/hr (11/12/21 1155)      PRN Meds:.•  acetaminophen  •  influenza vaccine  •  melatonin  •  ondansetron  •  [COMPLETED] Insert peripheral IV **AND** sodium chloride  •  sodium chloride    Objective     VITAL SIGNS  Vitals:    11/11/21 2223 11/12/21 0227 11/12/21 0516 11/12/21 1012   BP: 143/69 142/61 124/62 135/69   BP Location: Right arm Left arm Left arm Left arm   Patient Position: Lying Lying Lying Lying   Pulse: 74 63 57 61   Resp: 16 16 16 16   Temp: 98 °F (36.7 °C) 98 °F (36.7 °C) 97.8 °F (36.6 °C) 97.4 °F (36.3 °C)   TempSrc: Oral Oral Oral Axillary   SpO2: 98% 95% 97% 96%   Weight:       Height:           Flowsheet Rows      First Filed Value   Admission Height 170.2 cm (67\") Documented at 11/11/2021 1635   Admission Weight 65.8 kg (145 lb) Documented at 11/11/2021 1635           TELEMETRY: Sinus rhythm with nonspecific ST segment abnormality    Physical Exam:  Constitutional:       Appearance: Well-developed.   Eyes:      General: No scleral icterus.     Conjunctiva/sclera: Conjunctivae normal.      Pupils: Pupils are equal, round, and reactive to light.   HENT:      Head: Normocephalic and atraumatic.   Neck:      Vascular: No carotid bruit or JVD.   Pulmonary:      Effort: " Pulmonary effort is normal.      Breath sounds: Normal breath sounds. No wheezing. No rales.   Cardiovascular:      Normal rate. Regular rhythm.      Murmurs: There is a systolic murmur.   Pulses:     Intact distal pulses.   Abdominal:      General: Bowel sounds are normal.      Palpations: Abdomen is soft.   Musculoskeletal: Normal range of motion.      Cervical back: Normal range of motion and neck supple. Skin:     General: Skin is warm and dry.      Findings: No rash.   Neurological:      Mental Status: Alert.      Comments: No focal deficits          Results Review:   I reviewed the patient's new clinical results.  Lab Results (last 24 hours)     Procedure Component Value Units Date/Time    Troponin [[id number]]  (Abnormal) Collected: 11/12/21 0641    Specimen: Blood Updated: 11/12/21 0752     Troponin T 0.060 ng/mL     Narrative:      Troponin T Reference Range:  <= 0.03 ng/mL-   Negative for AMI  >0.03 ng/mL-     Abnormal for myocardial necrosis.  Clinicians would have to utilize clinical acumen, EKG, Troponin and serial changes to determine if it is an Acute Myocardial Infarction or myocardial injury due to an underlying chronic condition.       Results may be falsely decreased if patient taking Biotin.      Basic Metabolic Panel [[id number]]  (Normal) Collected: 11/12/21 0641    Specimen: Blood Updated: 11/12/21 0749     Glucose 80 mg/dL      BUN 19 mg/dL      Creatinine 1.02 mg/dL      Sodium 138 mmol/L      Potassium 3.7 mmol/L      Chloride 102 mmol/L      CO2 23.0 mmol/L      Calcium 8.9 mg/dL      eGFR Non African Amer 67 mL/min/1.73      BUN/Creatinine Ratio 18.6     Anion Gap 13.0 mmol/L     Narrative:      GFR Normal >60  Chronic Kidney Disease <60  Kidney Failure <15      CK [[id number]]  (Abnormal) Collected: 11/12/21 0641    Specimen: Blood Updated: 11/12/21 0749     Creatine Kinase 1,150 U/L     CBC Auto Differential [[id number]]  (Abnormal) Collected: 11/12/21 0641    Specimen: Blood Updated:  11/12/21 0732     WBC 7.70 10*3/mm3      RBC 3.93 10*6/mm3      Hemoglobin 12.7 g/dL      Hematocrit 36.4 %      MCV 92.5 fL      MCH 32.2 pg      MCHC 34.8 g/dL      RDW 13.4 %      RDW-SD 43.3 fl      MPV 8.8 fL      Platelets 192 10*3/mm3      Neutrophil % 66.5 %      Lymphocyte % 17.7 %      Monocyte % 14.8 %      Eosinophil % 0.7 %      Basophil % 0.3 %      Neutrophils, Absolute 5.10 10*3/mm3      Lymphocytes, Absolute 1.40 10*3/mm3      Monocytes, Absolute 1.10 10*3/mm3      Eosinophils, Absolute 0.10 10*3/mm3      Basophils, Absolute 0.00 10*3/mm3      nRBC 0.1 /100 WBC     Troponin [[id number]]  (Abnormal) Collected: 11/11/21 2043    Specimen: Blood Updated: 11/11/21 2144     Troponin T 0.051 ng/mL     Narrative:      Troponin T Reference Range:  <= 0.03 ng/mL-   Negative for AMI  >0.03 ng/mL-     Abnormal for myocardial necrosis.  Clinicians would have to utilize clinical acumen, EKG, Troponin and serial changes to determine if it is an Acute Myocardial Infarction or myocardial injury due to an underlying chronic condition.       Results may be falsely decreased if patient taking Biotin.      COVID PRE-OP / PRE-PROCEDURE SCREENING ORDER (NO ISOLATION) - Swab, Nasopharynx [[id number]]  (Normal) Collected: 11/11/21 1918    Specimen: Swab from Nasopharynx Updated: 11/11/21 1941    Narrative:      The following orders were created for panel order COVID PRE-OP / PRE-PROCEDURE SCREENING ORDER (NO ISOLATION) - Swab, Nasopharynx.  Procedure                               Abnormality         Status                     ---------                               -----------         ------                     COVID-19,CEPHEID/SHERRI/BD...[[id number]]  Normal              Final result                 Please view results for these tests on the individual orders.    COVID-19,CEPHEID/SHERRI/BDMAX,COR/ZAHEER/PAD/DINESH IN-HOUSE(OR EMERGENT/ADD-ON),NP SWAB IN TRANSPORT MEDIA 3-4 HR TAT, RT-PCR - Swab, Nasopharynx [[id number]]  (Normal)  Collected: 11/11/21 1918    Specimen: Swab from Nasopharynx Updated: 11/11/21 1941     COVID19 Not Detected    Narrative:      Fact sheet for providers: https://www.fda.gov/media/256168/download     Fact sheet for patients: https://www.fda.gov/media/539980/download  Fact sheet for providers: https://www.fda.gov/media/780258/download    Fact sheet for patients: https://www.fda.gov/media/822248/download    Test performed by PCR.    Troponin [[id number]]  (Abnormal) Collected: 11/11/21 1726    Specimen: Blood Updated: 11/11/21 1816     Troponin T 0.032 ng/mL     Narrative:      Troponin T Reference Range:  <= 0.03 ng/mL-   Negative for AMI  >0.03 ng/mL-     Abnormal for myocardial necrosis.  Clinicians would have to utilize clinical acumen, EKG, Troponin and serial changes to determine if it is an Acute Myocardial Infarction or myocardial injury due to an underlying chronic condition.       Results may be falsely decreased if patient taking Biotin.      Basic Metabolic Panel [[id number]]  (Abnormal) Collected: 11/11/21 1726    Specimen: Blood Updated: 11/11/21 1814     Glucose 117 mg/dL      BUN 23 mg/dL      Creatinine 1.00 mg/dL      Sodium 139 mmol/L      Potassium 3.9 mmol/L      Chloride 102 mmol/L      CO2 23.0 mmol/L      Calcium 9.5 mg/dL      eGFR Non African Amer 69 mL/min/1.73      BUN/Creatinine Ratio 23.0     Anion Gap 14.0 mmol/L     Narrative:      GFR Normal >60  Chronic Kidney Disease <60  Kidney Failure <15      CK [[id number]]  (Abnormal) Collected: 11/11/21 1726    Specimen: Blood Updated: 11/11/21 1814     Creatine Kinase 318 U/L     CBC & Differential [[id number]]  (Abnormal) Collected: 11/11/21 1726    Specimen: Blood Updated: 11/11/21 1732    Narrative:      The following orders were created for panel order CBC & Differential.  Procedure                               Abnormality         Status                     ---------                               -----------         ------                      CBC Auto Differential[[id number]]        Abnormal            Final result                 Please view results for these tests on the individual orders.    CBC Auto Differential [[id number]]  (Abnormal) Collected: 11/11/21 1726    Specimen: Blood Updated: 11/11/21 1732     WBC 14.00 10*3/mm3      RBC 4.14 10*6/mm3      Hemoglobin 13.5 g/dL      Hematocrit 38.4 %      MCV 92.7 fL      MCH 32.6 pg      MCHC 35.2 g/dL      RDW 13.4 %      RDW-SD 43.8 fl      MPV 8.3 fL      Platelets 196 10*3/mm3      Neutrophil % 88.9 %      Lymphocyte % 3.3 %      Monocyte % 7.6 %      Eosinophil % 0.0 %      Basophil % 0.2 %      Neutrophils, Absolute 12.40 10*3/mm3      Lymphocytes, Absolute 0.50 10*3/mm3      Monocytes, Absolute 1.10 10*3/mm3      Eosinophils, Absolute 0.00 10*3/mm3      Basophils, Absolute 0.00 10*3/mm3      nRBC 0.0 /100 WBC           Imaging Results (Last 24 Hours)     ** No results found for the last 24 hours. **          EKG      I personally viewed and interpreted the patient's EKG/Telemetry data:    ECHOCARDIOGRAM:      STRESS MYOVIEW:    CARDIAC CATHETERIZATION:    OTHER:         Assessment/Plan     Active Problems:    Weakness  Status post fall  Elevated troponin    Patient presented after fall and had elevated CK and mild elevated troponin  Patient's EKG does not show any acute changes  Patient will have an echocardiogram for LV function and valvular abnormalities  Because of his advanced age and comorbid conditions, will continue conservative medical therapy only  Patient will be monitored for any arrhythmias while he is in the hospital  Patient will benefit from low-dose aspirin and may be low-dose beta-blockers if his blood pressure heart rate permit    I discussed the patients findings and my recommendations with patient and nurse and family    Ry Tovar MD  11/12/21  12:05 EST

## 2021-11-12 NOTE — NURSING NOTE
Notified Dr. Rajput at 2143 of lab results received, Troponin 0.051, patient NSR on monitor. New order received to give ASA 324mg PO x 1 dose. Read back order and verified.

## 2021-11-12 NOTE — DISCHARGE PLACEMENT REQUEST
"Nilam Rincon (89 y.o. Male)             Date of Birth Social Security Number Address Home Phone MRN    10/11/1922  [address] [address] [phone] [id number]    [employer] Marital Status             Hinduism        Admission Date Admission Type Admitting Provider Attending Provider Department, Room/Bed    11/11/21 Emergency Brandin Rajput MD Pahner, Steven R, MD [facility] OBSERVATION, 111/1    Discharge Date Discharge Disposition Discharge Destination                         Attending Provider: Brandin Rajput MD    Allergies: Penicillins, Sulfa Antibiotics    Isolation: None   Infection: None   Code Status: No CPR   Advance Care Planning Activity    Ht: 167.6 cm (66\")   Wt: 60.2 kg (132 lb 11.5 oz)    Admission Cmt: None   Principal Problem: None                Active Insurance as of 11/11/2021     Primary Coverage     Payor Plan Insurance Group Employer/Plan Group    MISC COMMERCIAL MISC COMMERCIAL [id number]     Coverage Address Coverage Phone Number Coverage Fax Number Effective Dates    [address] [address] [phone]  1/1/2019 - None Entered    [facility] [id number]       Subscriber Name Subscriber Birth Date Member ID       NILAM RINCON 10/11/1922 [id number]                 Emergency Contacts      (Rel.) Home Phone Work Phone Mobile Phone    JEANNINE ZAMORA (Grandchild) -- -- [phone]              " No Exposure

## 2021-11-13 VITALS
TEMPERATURE: 97.9 F | BODY MASS INDEX: 20.98 KG/M2 | HEART RATE: 55 BPM | DIASTOLIC BLOOD PRESSURE: 71 MMHG | HEIGHT: 66 IN | WEIGHT: 130.51 LBS | OXYGEN SATURATION: 98 % | RESPIRATION RATE: 14 BRPM | SYSTOLIC BLOOD PRESSURE: 150 MMHG

## 2021-11-13 LAB
ANION GAP SERPL CALCULATED.3IONS-SCNC: 10 MMOL/L (ref 5–15)
BUN SERPL-MCNC: 14 MG/DL (ref 8–23)
BUN/CREAT SERPL: 15.7 (ref 7–25)
CALCIUM SPEC-SCNC: 8.1 MG/DL (ref 8.2–9.6)
CHLORIDE SERPL-SCNC: 104 MMOL/L (ref 98–107)
CK SERPL-CCNC: 581 U/L (ref 20–200)
CO2 SERPL-SCNC: 23 MMOL/L (ref 22–29)
CREAT SERPL-MCNC: 0.89 MG/DL (ref 0.76–1.27)
GFR SERPL CREATININE-BSD FRML MDRD: 79 ML/MIN/1.73
GLUCOSE SERPL-MCNC: 107 MG/DL (ref 65–99)
POTASSIUM SERPL-SCNC: 3.5 MMOL/L (ref 3.5–5.2)
SODIUM SERPL-SCNC: 137 MMOL/L (ref 136–145)

## 2021-11-13 PROCEDURE — 90686 IIV4 VACC NO PRSV 0.5 ML IM: CPT | Performed by: EMERGENCY MEDICINE

## 2021-11-13 PROCEDURE — G0008 ADMIN INFLUENZA VIRUS VAC: HCPCS | Performed by: EMERGENCY MEDICINE

## 2021-11-13 PROCEDURE — G0378 HOSPITAL OBSERVATION PER HR: HCPCS

## 2021-11-13 PROCEDURE — 82550 ASSAY OF CK (CPK): CPT | Performed by: NURSE PRACTITIONER

## 2021-11-13 PROCEDURE — 97161 PT EVAL LOW COMPLEX 20 MIN: CPT

## 2021-11-13 PROCEDURE — 25010000002 INFLUENZA VAC SPLIT QUAD 0.5 ML SUSPENSION PREFILLED SYRINGE: Performed by: EMERGENCY MEDICINE

## 2021-11-13 PROCEDURE — 97530 THERAPEUTIC ACTIVITIES: CPT

## 2021-11-13 PROCEDURE — 80048 BASIC METABOLIC PNL TOTAL CA: CPT | Performed by: NURSE PRACTITIONER

## 2021-11-13 PROCEDURE — 99214 OFFICE O/P EST MOD 30 MIN: CPT | Performed by: INTERNAL MEDICINE

## 2021-11-13 PROCEDURE — 96361 HYDRATE IV INFUSION ADD-ON: CPT

## 2021-11-13 RX ORDER — ASPIRIN 81 MG/1
81 TABLET ORAL DAILY
Qty: 30 TABLET | Refills: 2 | Status: SHIPPED | OUTPATIENT
Start: 2021-11-14

## 2021-11-13 RX ORDER — ASPIRIN 81 MG/1
81 TABLET ORAL DAILY
Status: DISCONTINUED | OUTPATIENT
Start: 2021-11-13 | End: 2021-11-13 | Stop reason: HOSPADM

## 2021-11-13 RX ADMIN — SODIUM CHLORIDE 100 ML/HR: 9 INJECTION, SOLUTION INTRAVENOUS at 01:49

## 2021-11-13 RX ADMIN — INFLUENZA VIRUS VACCINE 0.5 ML: 15; 15; 15; 15 SUSPENSION INTRAMUSCULAR at 12:56

## 2021-11-13 RX ADMIN — SODIUM CHLORIDE, PRESERVATIVE FREE 10 ML: 5 INJECTION INTRAVENOUS at 08:29

## 2021-11-13 RX ADMIN — ASPIRIN 81 MG: 81 TABLET, COATED ORAL at 09:54

## 2021-11-13 RX ADMIN — DORZOLAMIDE HYDROCHLORIDE 1 DROP: 20 SOLUTION/ DROPS OPHTHALMIC at 08:29

## 2021-11-13 NOTE — PLAN OF CARE
Goal Outcome Evaluation:  Plan of Care Reviewed With: patient           Outcome Summary: Pt is to be discharged today

## 2021-11-13 NOTE — PLAN OF CARE
Goal Outcome Evaluation:  Plan of Care Reviewed With: patient, grandchild(fernando)         Outcome Summary: Pt is a 89 y.o male presented to ER s/p fall while ambulating at home. Pt lives with granddaughter and her  in a multiple level home with 5 steps (with L ascending hand rail) to enetr the home. Pt's bedroom and bathroom are step up on one level and he does not have to access steps within home. Prior to fall pt was mod I with ADL's, stair management,  ambulation within home with FWW and in community with rolling walker. At this time pt is fearful of falling, demonstartes deficits in strength, balance and endurance with pt at increased risk for further falls. He requires one person min A with bed moiblity tasks, transfers and ambulation with use of FWW. Pt is far from baseline function and would require skilled PT services. Recommendation is for Home Health Therapy at d/c. PT to follow 3x/wk while at [facility].

## 2021-11-13 NOTE — PLAN OF CARE
Goal Outcome Evaluation:  Plan of Care Reviewed With: patient, grandchild(fernando)        Progress: improving  Outcome Summary: PT AOx4 [place] on RA. N c/o voiced. Pt weak requiring assist x1-2. Grand Daugher at bedside. PT to Eval. will cont to monitor

## 2021-11-13 NOTE — PROGRESS NOTES
"Referring Provider: Hospitalist    Reason for follow-up: Status post fall and elevated troponin     Patient Care Team:  Provider, No Known as PCP - General    Subjective .  No symptoms of chest pain or shortness of breath    Objective  Lying in bed comfortably     Review of Systems   Constitutional: Negative for fever and malaise/fatigue.   Cardiovascular: Negative for chest pain, dyspnea on exertion and palpitations.   Respiratory: Negative for cough and shortness of breath.    Skin: Negative for rash.   Gastrointestinal: Negative for abdominal pain, nausea and vomiting.   Neurological: Negative for focal weakness and headaches.   All other systems reviewed and are negative.      Penicillins and Sulfa antibiotics    Scheduled Meds:aspirin, 81 mg, Oral, Daily  docusate sodium, 100 mg, Oral, Daily  dorzolamide, 1 drop, Both Eyes, BID  latanoprost, 1 drop, Both Eyes, Nightly  sodium chloride, 10 mL, Intravenous, Q12H      Continuous Infusions:sodium chloride, 100 mL/hr, Last Rate: 100 mL/hr (11/13/21 1105)      PRN Meds:.•  acetaminophen  •  influenza vaccine  •  melatonin  •  ondansetron  •  [COMPLETED] Insert peripheral IV **AND** sodium chloride  •  sodium chloride        VITAL SIGNS  Vitals:    11/13/21 0430 11/13/21 0500 11/13/21 0700 11/13/21 1025   BP: 125/69   150/71   BP Location: Left arm   Left arm   Patient Position: Sitting   Lying   Pulse: 56 53 54 55   Resp: 16   14   Temp: 98.4 °F (36.9 °C)   97.9 °F (36.6 °C)   TempSrc: Oral   Oral   SpO2: 98%   98%   Weight:       Height:           Flowsheet Rows      First Filed Value   Admission Height 170.2 cm (67\") Documented at 11/11/2021 1635   Admission Weight 65.8 kg (145 lb) Documented at 11/11/2021 1635           TELEMETRY: Sinus rhythm    Physical Exam:  Constitutional:       Appearance: Well-developed.   Eyes:      General: No scleral icterus.     Conjunctiva/sclera: Conjunctivae normal.   HENT:      Head: Normocephalic and atraumatic.   Neck:      " Vascular: No carotid bruit or JVD.   Pulmonary:      Effort: Pulmonary effort is normal.      Breath sounds: Normal breath sounds. No wheezing. No rales.   Cardiovascular:      Normal rate. Regular rhythm.      Murmurs: There is a systolic murmur.   Pulses:     Intact distal pulses.   Abdominal:      General: Bowel sounds are normal.      Palpations: Abdomen is soft.   Musculoskeletal:      Cervical back: Normal range of motion and neck supple. Skin:     General: Skin is warm and dry.      Findings: No rash.   Neurological:      Mental Status: Alert.          Results Review:   I reviewed the patient's new clinical results.  Lab Results (last 24 hours)     Procedure Component Value Units Date/Time    Basic Metabolic Panel [[id number]]  (Abnormal) Collected: 11/13/21 1005    Specimen: Blood Updated: 11/13/21 1125     Glucose 107 mg/dL      BUN 14 mg/dL      Creatinine 0.89 mg/dL      Sodium 137 mmol/L      Potassium 3.5 mmol/L      Chloride 104 mmol/L      CO2 23.0 mmol/L      Calcium 8.1 mg/dL      eGFR Non African Amer 79 mL/min/1.73      BUN/Creatinine Ratio 15.7     Anion Gap 10.0 mmol/L     Narrative:      GFR Normal >60  Chronic Kidney Disease <60  Kidney Failure <15      CK [[id number]]  (Abnormal) Collected: 11/13/21 1005    Specimen: Blood Updated: 11/13/21 1125     Creatine Kinase 581 U/L     Troponin [[id number]]  (Abnormal) Collected: 11/12/21 1354    Specimen: Blood Updated: 11/12/21 1445     Troponin T 0.044 ng/mL     Narrative:      Troponin T Reference Range:  <= 0.03 ng/mL-   Negative for AMI  >0.03 ng/mL-     Abnormal for myocardial necrosis.  Clinicians would have to utilize clinical acumen, EKG, Troponin and serial changes to determine if it is an Acute Myocardial Infarction or myocardial injury due to an underlying chronic condition.       Results may be falsely decreased if patient taking Biotin.      CK [[id number]]  (Abnormal) Collected: 11/12/21 1354    Specimen: Blood Updated: 11/12/21 6263      Creatine Kinase 904 U/L           Imaging Results (Last 24 Hours)     ** No results found for the last 24 hours. **          EKG      I personally viewed and interpreted the patient's EKG/Telemetry data:    ECHOCARDIOGRAM:    STRESS MYOVIEW:    CARDIAC CATHETERIZATION:    OTHER:         Assessment/Plan     Active Problems:    Weakness  Elevated troponin  Status post fall      Patient presented after fall and had elevated CK and mildly red troponin  Patient does not have any symptoms of chest pain  Patient had an echocardiogram which showed normal LV function  Discussed with the patient and family and will watch him and do conservative medical therapy because of his age  Patient was monitored for any arrhythmias and did not have any arrhythmias and is in sinus rhythm  We will follow him as an outpatient as needed    I discussed the patients findings and my recommendations with patient and nurse    Ry Tovar MD  11/13/21  11:35 EST

## 2021-11-13 NOTE — THERAPY EVALUATION
Patient Name: Julien Rincon  : 10/11/1922    MRN: [id number]                              Today's Date: 2021       Admit Date: 2021    Visit Dx:     ICD-10-CM ICD-9-CM   1. Weakness  R53.1 780.79   2. Fall, initial encounter  W19.XXXA E888.9   3. 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        General Information     Row Name 21 1008          Physical Therapy Time and Intention    Document Type evaluation  -     Mode of Treatment individual therapy; physical therapy  -     Row Name 21 1008          General Information    Patient Profile Reviewed yes  -SM     Prior Level of Function independent:; all household mobility; community mobility; ADL's  Per pt and granddaughter at bedside pt was Mod I with all functional moiblity tasks, ADL's and ambulation within home with FWW and used a rolling walker with ambulation in community (doctor appoitments)  -     Existing Precautions/Restrictions fall  -     Barriers to Rehab medically complex; hearing deficit  -     Row Name 21 1008          Living Environment    Lives With child(fernando), adult  Pt lives in a multiple level home with 5 steps (L ascending hand rail) to enter. Pt stays on one level once he is within home.  -     Row Name 21 1008          Home Main Entrance    Number of Stairs, Main Entrance five  -     Stair Railings, Main Entrance railing on left side (ascending)  -     Row Name 21 1008          Stairs Within Home, Primary    Stairs, Within Home, Primary --  Pt does not have to acess stairs within home. Bedroom and bathroom set up on one floor  -     Number of Stairs, Within Home, Primary other (see comments)  Pt does not have to acess stairs within home. Bedroom  and bathroom set up on one floor  -     Row Name 11/13/21 1008          Cognition    Orientation Status (Cognition) oriented x 4  -     Row Name 11/13/21 1008          Safety Issues, Functional Mobility    Impairments Affecting Function (Mobility) balance; endurance/activity tolerance; strength; postural/trunk control  -           User Key  (r) = Recorded By, (t) = Taken By, (c) = Cosigned By    Initials Name Provider Type     Julienne Sapp, PT Physical Therapist               Mobility     Row Name 11/13/21 1012          Bed Mobility    Bed Mobility bed mobility (all) activities  -     All Activities, [place] (Bed Mobility) minimum assist (75% patient effort); verbal cues; nonverbal cues (demo/gesture); 1 person assist  -     Assistive Device (Bed Mobility) bed rails; head of bed elevated  -[facility] Name 11/13/21 1012          Bed-Chair Transfer    Bed-Chair [place] (Transfers) minimum assist (75% patient effort); verbal cues; nonverbal cues (demo/gesture); 1 person to manage equipment  -     Assistive Device (Bed-Chair Transfers) walker, front-wheeled  -[facility] Name 11/13/21 1012          Sit-Stand Transfer    Sit-Stand [place] (Transfers) minimum assist (75% patient effort); verbal cues; nonverbal cues (demo/gesture); 1 person to manage equipment  -     Assistive Device (Sit-Stand Transfers) walker, front-wheeled  -[facility] Name 11/13/21 1012          Gait/Stairs (Locomotion)    [place] Level (Gait) minimum assist (75% patient effort); verbal cues; nonverbal cues (demo/gesture)  -     Assistive Device (Gait) walker, front-wheeled  -     Distance in Feet (Gait) ~ 5 feet ambulatory transfer to chair  -     Deviations/Abnormal Patterns (Gait) stride length decreased; weight shifting decreased; other (see comments)  Pt fearful of falling  -     [place] Level (Stairs) not tested  -           User Key  (r) = Recorded By, (t) = Taken By, (c) = Cosigned By     Initials Name Provider Type    Julienne Tinsley PT Physical Therapist               Obj/Interventions     [facility] Name 11/13/21 1014          Range of Motion Comprehensive    General Range of Motion bilateral lower extremity ROM WFL  -[facility] Name 11/13/21 1014          Strength Comprehensive (MMT)    General Manual Muscle Testing (MMT) Assessment lower extremity strength deficits identified  -     Comment, General Manual Muscle Testing (MMT) Assessment BLE MMT- grossly graded 4-/5  -[facility] Name 11/13/21 1014          Balance    Balance Assessment sitting static balance; sitting dynamic balance; standing static balance; standing dynamic balance  -     Static Sitting Balance mild impairment; unsupported  -     Dynamic Sitting Balance moderate impairment; unsupported; mild impairment; supported  -     Static Standing Balance moderate impairment; supported  -     Dynamic Standing Balance moderate impairment; supported  -[facility] Name 11/13/21 1014          Sensory Assessment (Somatosensory)    Sensory Assessment (Somatosensory) unable/difficult to assess  -           User Key  (r) = Recorded By, (t) = Taken By, (c) = Cosigned By    Initials Name Provider Type    Julienne Tinsley PT Physical Therapist               Goals/Plan     [facility] Name 11/13/21 1023          Bed Mobility Goal 1 (PT)    Activity/Assistive Device (Bed Mobility Goal 1, PT) bed mobility activities, all  -     [place] Level/Cues Needed (Bed Mobility Goal 1, PT) modified independence  -SM     Time Frame (Bed Mobility Goal 1, PT) long term goal (LTG)  -[facility] Name 11/13/21 1023          Transfer Goal 1 (PT)    Activity/Assistive Device (Transfer Goal 1, PT) transfers, all  -SM     [place] Level/Cues Needed (Transfer Goal 1, PT) modified independence  -SM     Time Frame (Transfer Goal 1, PT) long term goal (LTG)  -[facility] Name 11/13/21 1023          Gait Training Goal 1 (PT)    Activity/Assistive Device (Gait  Training Goal 1, PT) gait (walking locomotion); assistive device use; backward stepping; forward stepping; improve balance and speed; increase endurance/gait distance; maintain weight-bearing status  -SM     [place] Level (Gait Training Goal 1, PT) modified independence  -SM     Distance (Gait Training Goal 1, PT) 100 feet  -     Time Frame (Gait Training Goal 1, PT) long term goal (LTG)  -     Row Name 11/13/21 1023          Stairs Goal 1 (PT)    Activity/Assistive Device (Stairs Goal 1, PT) stairs, all skills  -     [place] Level/Cues Needed (Stairs Goal 1, PT) modified independence  -SM     Number of Stairs (Stairs Goal 1, PT) 5  -SM           User Key  (r) = Recorded By, (t) = Taken By, (c) = Cosigned By    Initials Name Provider Type    Julienne Tinsley, PT Physical Therapist               Clinical Impression     Row Name 11/13/21 1015          Pain    Additional Documentation Pain Scale: Numbers Pre/Post-Treatment (Group)  -     Row Name 11/13/21 1015          Pain Scale: Numbers Pre/Post-Treatment    Pretreatment Pain Rating 0/10 - no pain  -SM     Posttreatment Pain Rating 0/10 - no pain  -SM     Row Name 11/13/21 1015          Plan of Care Review    Plan of Care Reviewed With patient; grandchild(fernando)  -     Outcome Summary Pt is a 89 y.o male presented to ER s/p fall while ambulating at home. Pt lives with granddaughter and her  in a multiple level home with 5 steps (with L ascending hand rail) to enetr the home. Pt's bedroom and bathroom are step up on one level and he does not have to access steps within home. Prior to fall pt was mod I with ADL's, stair management,  ambulation within home with FWW and in community with rolling walker. At this time pt is fearful of falling, demonstartes deficits in strength, balance and endurance with pt at increased risk for further falls. He requires one person min A with bed moiblity tasks, transfers and ambulation with use of FWW. Pt is  far from baseline function and would require skilled PT services. Recommendation is for Home Health Therapy at d/c. PT to follow 3x/wk while at [facility].  -     Row Name 11/13/21 1015          Therapy Assessment/Plan (PT)    Rehab Potential (PT) good, to achieve stated therapy goals  -     Criteria for Skilled Interventions Met (PT) skilled treatment is necessary  -     Predicted Duration of Therapy Intervention (PT) until discharge  -     Row Name 11/13/21 1015          Positioning and Restraints    Pre-Treatment Position in bed  -     Post Treatment Position chair  -SM     In Chair notified nsg; call light within reach; encouraged to call for assist; with family/caregiver  -           User Key  (r) = Recorded By, (t) = Taken By, (c) = Cosigned By    Initials Name Provider Type    Julienne Tinsley PT Physical Therapist               Outcome Measures     Row Name 11/13/21 1024          How much help from another person do you currently need...    Turning from your back to your side while in flat bed without using bedrails? 3  -SM     Moving from lying on back to sitting on the side of a flat bed without bedrails? 3  -SM     Moving to and from a bed to a chair (including a wheelchair)? 3  -SM     Standing up from a chair using your arms (e.g., wheelchair, bedside chair)? 3  -SM     Climbing 3-5 steps with a railing? 2  -SM     To walk in hospital room? 3  -SM     AM-PAC 6 Clicks Score (PT) 17  -     Row Name 11/13/21 1024          Functional Assessment    Outcome Measure Options AM-PAC 6 Clicks Basic Mobility (PT)  -           User Key  (r) = Recorded By, (t) = Taken By, (c) = Cosigned By    Initials Name Provider Type    Julienne Tinsley PT Physical Therapist                             Physical Therapy Education                 Title: PT OT SLP Therapies (Done)     Topic: Physical Therapy (Done)     Point: Mobility training (Done)     Learning Progress Summary           Patient Acceptance, E,TB,  VU,NR by  at 11/13/2021 1024   Family Acceptance, E,TB, VU,NR by  at 11/13/2021 1024                   Point: Home exercise program (Done)     Learning Progress Summary           Patient Acceptance, E,TB, VU,NR by  at 11/13/2021 1024   Family Acceptance, E,TB, VU,NR by  at 11/13/2021 1024                   Point: Body mechanics (Done)     Learning Progress Summary           Patient Acceptance, E,TB, VU,NR by  at 11/13/2021 1024   Family Acceptance, E,TB, VU,NR by  at 11/13/2021 1024                   Point: Precautions (Done)     Learning Progress Summary           Patient Acceptance, E,TB, VU,NR by  at 11/13/2021 1024   Family Acceptance, E,TB, VU,NR by  at 11/13/2021 1024                               User Key     Initials Effective Dates Name Provider Type Discipline     09/15/21 -  Julienne Sapp, PT Physical Therapist PT              PT Recommendation and Plan  Planned Therapy Interventions (PT): balance training, bed mobility training, gait training, home exercise program, patient/family education, strengthening, stair training, transfer training  Plan of Care Reviewed With: patient, grandchild(fernando)  Outcome Summary: Pt is a 89 y.o male presented to ER s/p fall while ambulating at home. Pt lives with granddaughter and her  in a multiple level home with 5 steps (with L ascending hand rail) to enetr the home. Pt's bedroom and bathroom are step up on one level and he does not have to access steps within home. Prior to fall pt was mod I with ADL's, stair management,  ambulation within home with FWW and in community with rolling walker. At this time pt is fearful of falling, demonstartes deficits in strength, balance and endurance with pt at increased risk for further falls. He requires one person min A with bed moiblity tasks, transfers and ambulation with use of FWW. Pt is far from baseline function and would require skilled PT services. Recommendation is for Home Health Therapy at d/c.  PT to follow 3x/wk while at [facility].     Time Calculation:    PT Charges     Row Name 11/13/21 1025             Time Calculation    Start Time 0851  -      Stop Time 0920  -      Time Calculation (min) 29 min  -      PT Received On 11/13/21  -      PT - Next Appointment 11/15/21  -      PT Goal Re-Cert Due Date 11/27/21  -            User Key  (r) = Recorded By, (t) = Taken By, (c) = Cosigned By    Initials Name Provider Type     Julienne Luther PT Physical Therapist              Therapy Charges for Today     Code Description Service Date Service Provider Modifiers Qty    00383707368 HC PT EVAL LOW COMPLEXITY 4 11/13/2021 Julienne Luther, PT GP 1    68901295111 HC PT THERAPEUTIC ACT EA 15 MIN 11/13/2021 Julienne Luther PT GP 1          PT G-Codes  Outcome Measure Options: AM-PAC 6 Clicks Basic Mobility (PT)  AM-PAC 6 Clicks Score (PT): 17    JULIENNE LUTHER PT  11/13/2021

## 2021-11-16 LAB — QT INTERVAL: 415 MS

## 2022-02-02 ENCOUNTER — LAB REQUISITION (OUTPATIENT)
Dept: LAB | Facility: HOSPITAL | Age: 87
End: 2022-02-02

## 2022-02-02 DIAGNOSIS — M62.82 RHABDOMYOLYSIS: ICD-10-CM

## 2022-02-02 LAB
25(OH)D3 SERPL-MCNC: 23.9 NG/ML (ref 30–100)
ALBUMIN SERPL-MCNC: 4 G/DL (ref 3.5–5.2)
ALBUMIN/GLOB SERPL: 1.3 G/DL
ALP SERPL-CCNC: 59 U/L (ref 39–117)
ALT SERPL W P-5'-P-CCNC: 28 U/L (ref 1–41)
ANION GAP SERPL CALCULATED.3IONS-SCNC: 10 MMOL/L (ref 5–15)
AST SERPL-CCNC: 27 U/L (ref 1–40)
BASOPHILS # BLD AUTO: 0.03 10*3/MM3 (ref 0–0.2)
BASOPHILS NFR BLD AUTO: 0.4 % (ref 0–1.5)
BILIRUB SERPL-MCNC: 0.2 MG/DL (ref 0–1.2)
BUN SERPL-MCNC: 35 MG/DL (ref 8–23)
BUN/CREAT SERPL: 33.3 (ref 7–25)
CALCIUM SPEC-SCNC: 9.3 MG/DL (ref 8.2–9.6)
CHLORIDE SERPL-SCNC: 102 MMOL/L (ref 98–107)
CO2 SERPL-SCNC: 24 MMOL/L (ref 22–29)
CREAT SERPL-MCNC: 1.05 MG/DL (ref 0.76–1.27)
DEPRECATED RDW RBC AUTO: 40.8 FL (ref 37–54)
EOSINOPHIL # BLD AUTO: 0.13 10*3/MM3 (ref 0–0.4)
EOSINOPHIL NFR BLD AUTO: 1.9 % (ref 0.3–6.2)
ERYTHROCYTE [DISTWIDTH] IN BLOOD BY AUTOMATED COUNT: 12.3 % (ref 12.3–15.4)
FOLATE SERPL-MCNC: 10.6 NG/ML (ref 4.78–24.2)
GFR SERPL CREATININE-BSD FRML MDRD: 65 ML/MIN/1.73
GLOBULIN UR ELPH-MCNC: 3 GM/DL
GLUCOSE SERPL-MCNC: 96 MG/DL (ref 65–99)
HCT VFR BLD AUTO: 39 % (ref 37.5–51)
HGB BLD-MCNC: 13.2 G/DL (ref 13–17.7)
IMM GRANULOCYTES # BLD AUTO: 0.03 10*3/MM3 (ref 0–0.05)
IMM GRANULOCYTES NFR BLD AUTO: 0.4 % (ref 0–0.5)
LYMPHOCYTES # BLD AUTO: 1.55 10*3/MM3 (ref 0.7–3.1)
LYMPHOCYTES NFR BLD AUTO: 22.2 % (ref 19.6–45.3)
MCH RBC QN AUTO: 30.9 PG (ref 26.6–33)
MCHC RBC AUTO-ENTMCNC: 33.8 G/DL (ref 31.5–35.7)
MCV RBC AUTO: 91.3 FL (ref 79–97)
MONOCYTES # BLD AUTO: 0.9 10*3/MM3 (ref 0.1–0.9)
MONOCYTES NFR BLD AUTO: 12.9 % (ref 5–12)
NEUTROPHILS NFR BLD AUTO: 4.33 10*3/MM3 (ref 1.7–7)
NEUTROPHILS NFR BLD AUTO: 62.2 % (ref 42.7–76)
NRBC BLD AUTO-RTO: 0 /100 WBC (ref 0–0.2)
PLATELET # BLD AUTO: 254 10*3/MM3 (ref 140–450)
PMV BLD AUTO: 10.8 FL (ref 6–12)
POTASSIUM SERPL-SCNC: 4.5 MMOL/L (ref 3.5–5.2)
PROT SERPL-MCNC: 7 G/DL (ref 6–8.5)
RBC # BLD AUTO: 4.27 10*6/MM3 (ref 4.14–5.8)
SODIUM SERPL-SCNC: 136 MMOL/L (ref 136–145)
T4 SERPL-MCNC: 6.55 MCG/DL (ref 4.5–11.7)
TSH SERPL DL<=0.05 MIU/L-ACNC: 1.49 UIU/ML (ref 0.27–4.2)
VIT B12 BLD-MCNC: 614 PG/ML (ref 211–946)
WBC NRBC COR # BLD: 6.97 10*3/MM3 (ref 3.4–10.8)

## 2022-02-02 PROCEDURE — 82746 ASSAY OF FOLIC ACID SERUM: CPT

## 2022-02-02 PROCEDURE — 84436 ASSAY OF TOTAL THYROXINE: CPT

## 2022-02-02 PROCEDURE — 82306 VITAMIN D 25 HYDROXY: CPT

## 2022-02-02 PROCEDURE — 82607 VITAMIN B-12: CPT

## 2022-02-02 PROCEDURE — 85025 COMPLETE CBC W/AUTO DIFF WBC: CPT

## 2022-02-02 PROCEDURE — 80053 COMPREHEN METABOLIC PANEL: CPT

## 2022-02-02 PROCEDURE — 84443 ASSAY THYROID STIM HORMONE: CPT

## 2022-05-21 NOTE — DISCHARGE SUMMARY
[facility]  DISCHARGE SUMMARY        Prepared For PCP:  Provider, No Known    Patient Name: Julien Rincon  : 10/11/1922  MRN: [id number]      Date of Admission:   2021    Date of Discharge:  2021    Length of stay:  LOS: 0 days     Hospital Course     Presenting Problem:   Weakness [R53.1]  Tooth avulsion, initial encounter [S03.2XXA]  Fall, initial encounter [W19.XXXA]      Active Hospital Problems    Diagnosis  POA   • Weakness [R53.1]  Yes      Resolved Hospital Problems   No resolved problems to display.           Hospital Course:  Julien Rincon is a 89 y.o. male who presented to the ER with a chief complaint of fall at home without dizziness with an inability to get up after the event.  The patient had noted mild rhabdomyolysis with mildly elevated troponin T which was in proportion to elevated CK total.  CK total and troponin improved significantly with IV fluids for hydration.  The patient was evaluated by cardiology who ordered echocardiogram which was without gross abnormalities.  Cardiology recommended no further cardiac work-up at this time.  The patient's blood pressure was stable and in good range and he had mild asymptomatic bradycardia with heart rate in the high 50s so no beta-blocker was added.  Patient was evaluated by physical therapy with recommendation for in-home physical therapy versus inpatient physical therapy.  After discussion with the patient and his granddaughter with whom he lives, home physical therapy was decided to be in the patient's best interest.  Patient's granddaughter can stay with him most of the time at home except for taking her kids to and from school until the patient's strength and mobility get back to his baseline.  He will be [facility] by [facility].          Recommendation for Outpatient Providers:             Reasons For Change In Medications and Indications for New Medications:    Asa 81 mg daily     Day of Discharge      HPI:   89-year-old male who presents to the ER after falling at home with inability to get up.  The patient cannot remember exactly what caused him to fall but had reported to his granddaughter with whom he lives that he just had a complete failure of his body without mention of dizziness or lightheadedness.  The fall was captured on video that the granddaughter has set up to help monitor the patient when he is left alone.  It appears the patient was fixing his breakfast when he turned to walk placing his hands on the walker, his knees then buckle and he falls to his right side.  The patient's face was away from the camera so there is no way to determine signs of loss of consciousness or dizziness.  The patient was conscious when the granddaughter got home and had tried multiple times on his own to get up but just could not.  The patient denies any overt areas of pain.     Social history: The patient lives with his granddaughter and is normally alone part of the day.  He uses a walker for ambulation in his room.    Vital Signs:   Temp:  [97.8 °F (36.6 °C)-98.4 °F (36.9 °C)] 97.9 °F (36.6 °C)  Heart Rate:  [53-62] 55  Resp:  [14-18] 14  BP: (108-150)/(57-71) 150/71     ROS:  Review of Systems   All other systems reviewed and are negative.    Physical Exam  Vitals and nursing note reviewed.   Constitutional:       General: He is not in acute distress.     Appearance: Normal appearance. He is not ill-appearing.      Comments: The patient is frail   HENT:      Head: Normocephalic and atraumatic.      Right Ear: External ear normal.      Left Ear: External ear normal.      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 No congestion or rhinorrhea.      Mouth/Throat:      Mouth: Mucous membranes are moist.   Eyes:      General: No scleral icterus.        Right eye: No discharge.         Left eye: No discharge.      Extraocular Movements: Extraocular  movements intact.      Conjunctiva/sclera: Conjunctivae normal.      Pupils: Pupils are equal, round, and reactive to light.   Cardiovascular:      Rate and Rhythm: Normal rate and regular rhythm.      Pulses: Normal pulses.      Heart sounds: Normal heart sounds. No murmur heard.  Pulmonary:      Effort: Pulmonary effort is normal.      Breath sounds: Normal breath sounds.   Abdominal:      General: Bowel sounds are normal. There is no distension.      Palpations: Abdomen is soft.   Musculoskeletal:         General: Normal range of motion.      Cervical back: Normal range of motion and neck supple.      Right lower leg: No edema.      Left lower leg: No edema.   Skin:     General: Skin is warm and dry.      Capillary Refill: Capillary refill takes less than 2 seconds.   Neurological:      General: No focal deficit present.      Mental Status: He is alert. Mental status is at baseline.      Comments: The patient is alert and oriented to person, place and relative time including season and year.   Psychiatric:         Mood and Affect: Mood normal.         Behavior: Behavior normal.         Thought Content: Thought content normal.         Judgment: Judgment normal.          Pertinent  and/or Most Recent Results     Results from last 7 days   Lab Units 11/13/21  1005 11/12/21  0641 11/11/21  1726   WBC 10*3/mm3  --  7.70 14.00*   HEMOGLOBIN g/dL  --  12.7* 13.5   HEMATOCRIT %  --  36.4* 38.4   PLATELETS 10*3/mm3  --  192 196   SODIUM mmol/L 137 138 139   POTASSIUM mmol/L 3.5 3.7 3.9   CHLORIDE mmol/L 104 102 102   CO2 mmol/L 23.0 23.0 23.0   BUN mg/dL 14 19 23   CREATININE mg/dL 0.89 1.02 1.00   GLUCOSE mg/dL 107* 80 117*   CALCIUM mg/dL 8.1* 8.9 9.5           Invalid input(s): PROT, LABALBU        Invalid input(s): TG, LDLCALC, LDLREALC  Results from last 7 days   Lab Units 11/12/21  1354 11/12/21  0641 11/11/21  2043 11/11/21  1726   TROPONIN T ng/mL 0.044* 0.060* 0.051* 0.032*       Brief Urine Lab Results  (Last  result in the past 365 days)      Color   Clarity   Blood   Leuk Est   Nitrite   Protein   CREAT   Urine HCG        01/31/21 1300 Yellow   Clear   Moderate (2+)   Negative   Negative   30 mg/dL (1+)                 Microbiology Results Abnormal     Procedure Component Value - Date/Time    COVID PRE-OP / PRE-PROCEDURE SCREENING ORDER (NO ISOLATION) - Swab, Nasopharynx [[id number]]  (Normal) Collected: 11/11/21 1918    Lab Status: Final result Specimen: Swab from Nasopharynx Updated: 11/11/21 1941    Narrative:      The following orders were created for panel order COVID PRE-OP / PRE-PROCEDURE SCREENING ORDER (NO ISOLATION) - Swab, Nasopharynx.  Procedure                               Abnormality         Status                     ---------                               -----------         ------                     COVID-19,CEPHEID/SHERRI/BD...[[id number]]  Normal              Final result                 Please view results for these tests on the individual orders.    COVID-19,CEPHEID/SHERRI/BDMAX,COR/ZAHEER/PAD/DINESH IN-HOUSE(OR EMERGENT/ADD-ON),NP SWAB IN TRANSPORT MEDIA 3-4 HR TAT, RT-PCR - Swab, Nasopharynx [[id number]]  (Normal) Collected: 11/11/21 1918    Lab Status: Final result Specimen: Swab from Nasopharynx Updated: 11/11/21 1941     COVID19 Not Detected    Narrative:      Fact sheet for providers: https://www.fda.gov/media/379322/download     Fact sheet for patients: https://www.fda.gov/media/444172/download  Fact sheet for providers: https://www.fda.gov/media/230868/download    Fact sheet for patients: https://www.fda.gov/media/413082/download    Test performed by PCR.                       Results for orders placed during the hospital encounter of 11/11/21    Adult Transthoracic Echo Complete W/ Cont if Necessary Per Protocol    Interpretation Summary  · Left ventricular ejection fraction appears to be 66 - 70%.  · No pericardial effusion noted              Test Results Pending at Discharge        Procedures  Performed           Consults:   Consults     Date and Time Order Name Status Description    11/12/2021  8:10 AM Inpatient Cardiology Consult Completed             Discharge Details        Discharge Medications      New Medications      Instructions Start Date   aspirin 81 MG EC tablet   81 mg, Oral, Daily   Start Date: November 14, 2021        Continue These Medications      Instructions Start Date   docusate sodium 100 MG capsule  Commonly known as: COLACE   100 mg, Oral, Daily      dorzolamide 2 % ophthalmic solution  Commonly known as: TRUSOPT   1 drop, Both Eyes, 2 Times Daily      ibuprofen 200 MG tablet  Commonly known as: ADVIL,MOTRIN   200 mg, Oral, Every 6 Hours PRN      latanoprost 0.005 % ophthalmic solution  Commonly known as: XALATAN   1 drop, Both Eyes, Nightly             Allergies   Allergen Reactions   • Penicillins Unknown - Low Severity     Reported by patient   • Sulfa Antibiotics Hives         Discharge Disposition:  Home or Self Care    Diet:  Hospital:  Diet Order   Procedures   • Diet Regular, Texture; Soft to Chew         Discharge Activity: as per physical therapy recommendation; assist with ambulation         CODE STATUS:    Code Status and Medical Interventions:   Ordered at: 11/12/21 1311     Medical Intervention Limits:    NO intubation (DNI)    NO cardioversion    NO dialysis    NO artificial nutrition     Level Of Support Discussed With:    Patient    Next of Kin (If No Surrogate)     Code Status (Patient has no pulse and is not breathing):    No CPR (Do Not Attempt to Resuscitate)     Medical Interventions (Patient has pulse or is breathing):    Limited Support         Follow-up Appointments  No future appointments.    Additional Instructions for the Follow-ups that You Need to Schedule     Ambulatory Referral to Home Health   As directed      Face to Face Visit Date: 11/12/2021    Follow-up provider for Plan of Care?: I will be treating the patient on an ongoing basis.  Please send  me the Plan of Care for signature.    Follow-up provider: PROVIDER NOT IN SYSTEM [[id number]] (Racquel Davidson)    Reason/Clinical Findings: fall, weakness,    Describe mobility limitations that make leaving home difficult: tires easily    Nursing/Therapeutic Services Requested: Physical Therapy Skilled Nursing    Skilled nursing orders: Medication education Cardiopulmonary assessments    PT orders: Strengthening Home safety assessment    Frequency: 1 Week 1                 Condition on Discharge:      Stable      This patient has been examined wearing appropriate Personal Protective Equipment. 11/13/21      Electronically signed by CR Garcia, 11/13/21, 12:36 PM EST.      Time: I spent  60  minutes on this discharge activity which included face-to-face encounter with the patient/reviewing the data in the system/coordination of the care with the nursing staff as well as consultants/documentation/entering orders.     You can access the FollowMyHealth Patient Portal offered by [facility] by registering at the following website: [email or web address]. By joining [employer]’s FollowMyHealth portal, you will also be able to view your health information using other applications (apps) compatible with our system.